# Patient Record
Sex: MALE | Race: BLACK OR AFRICAN AMERICAN | NOT HISPANIC OR LATINO | URBAN - METROPOLITAN AREA
[De-identification: names, ages, dates, MRNs, and addresses within clinical notes are randomized per-mention and may not be internally consistent; named-entity substitution may affect disease eponyms.]

---

## 2022-09-02 ENCOUNTER — INPATIENT (INPATIENT)
Facility: HOSPITAL | Age: 42
LOS: 5 days | Discharge: HOME | End: 2022-09-08
Attending: PSYCHIATRY & NEUROLOGY | Admitting: PSYCHIATRY & NEUROLOGY

## 2022-09-02 VITALS — HEIGHT: 68 IN

## 2022-09-02 DIAGNOSIS — F20.9 SCHIZOPHRENIA, UNSPECIFIED: ICD-10-CM

## 2022-09-02 DIAGNOSIS — F29 UNSPECIFIED PSYCHOSIS NOT DUE TO A SUBSTANCE OR KNOWN PHYSIOLOGICAL CONDITION: ICD-10-CM

## 2022-09-02 LAB
ALBUMIN SERPL ELPH-MCNC: 5 G/DL — SIGNIFICANT CHANGE UP (ref 3.3–5)
ALP SERPL-CCNC: 76 U/L — SIGNIFICANT CHANGE UP (ref 40–120)
ALT FLD-CCNC: 11 U/L — SIGNIFICANT CHANGE UP (ref 4–41)
ANION GAP SERPL CALC-SCNC: 12 MMOL/L — SIGNIFICANT CHANGE UP (ref 7–14)
APAP SERPL-MCNC: <10 UG/ML — LOW (ref 15–25)
AST SERPL-CCNC: 23 U/L — SIGNIFICANT CHANGE UP (ref 4–40)
B PERT DNA SPEC QL NAA+PROBE: SIGNIFICANT CHANGE UP
B PERT+PARAPERT DNA PNL SPEC NAA+PROBE: SIGNIFICANT CHANGE UP
BASOPHILS # BLD AUTO: 0.04 K/UL — SIGNIFICANT CHANGE UP (ref 0–0.2)
BASOPHILS NFR BLD AUTO: 0.3 % — SIGNIFICANT CHANGE UP (ref 0–2)
BILIRUB SERPL-MCNC: 0.7 MG/DL — SIGNIFICANT CHANGE UP (ref 0.2–1.2)
BORDETELLA PARAPERTUSSIS (RAPRVP): SIGNIFICANT CHANGE UP
BUN SERPL-MCNC: 7 MG/DL — SIGNIFICANT CHANGE UP (ref 7–23)
C PNEUM DNA SPEC QL NAA+PROBE: SIGNIFICANT CHANGE UP
CALCIUM SERPL-MCNC: 10.3 MG/DL — SIGNIFICANT CHANGE UP (ref 8.4–10.5)
CHLORIDE SERPL-SCNC: 101 MMOL/L — SIGNIFICANT CHANGE UP (ref 98–107)
CO2 SERPL-SCNC: 29 MMOL/L — SIGNIFICANT CHANGE UP (ref 22–31)
CREAT SERPL-MCNC: 0.87 MG/DL — SIGNIFICANT CHANGE UP (ref 0.5–1.3)
EGFR: 109 ML/MIN/1.73M2 — SIGNIFICANT CHANGE UP
EOSINOPHIL # BLD AUTO: 0.01 K/UL — SIGNIFICANT CHANGE UP (ref 0–0.5)
EOSINOPHIL NFR BLD AUTO: 0.1 % — SIGNIFICANT CHANGE UP (ref 0–6)
ETHANOL SERPL-MCNC: <10 MG/DL — SIGNIFICANT CHANGE UP
FLUAV SUBTYP SPEC NAA+PROBE: SIGNIFICANT CHANGE UP
FLUBV RNA SPEC QL NAA+PROBE: SIGNIFICANT CHANGE UP
GLUCOSE SERPL-MCNC: 113 MG/DL — HIGH (ref 70–99)
HADV DNA SPEC QL NAA+PROBE: SIGNIFICANT CHANGE UP
HCOV 229E RNA SPEC QL NAA+PROBE: SIGNIFICANT CHANGE UP
HCOV HKU1 RNA SPEC QL NAA+PROBE: SIGNIFICANT CHANGE UP
HCOV NL63 RNA SPEC QL NAA+PROBE: SIGNIFICANT CHANGE UP
HCOV OC43 RNA SPEC QL NAA+PROBE: SIGNIFICANT CHANGE UP
HCT VFR BLD CALC: 48.3 % — SIGNIFICANT CHANGE UP (ref 39–50)
HGB BLD-MCNC: 16.4 G/DL — SIGNIFICANT CHANGE UP (ref 13–17)
HMPV RNA SPEC QL NAA+PROBE: SIGNIFICANT CHANGE UP
HPIV1 RNA SPEC QL NAA+PROBE: SIGNIFICANT CHANGE UP
HPIV2 RNA SPEC QL NAA+PROBE: SIGNIFICANT CHANGE UP
HPIV3 RNA SPEC QL NAA+PROBE: SIGNIFICANT CHANGE UP
HPIV4 RNA SPEC QL NAA+PROBE: SIGNIFICANT CHANGE UP
IANC: 9.51 K/UL — HIGH (ref 1.8–7.4)
IMM GRANULOCYTES NFR BLD AUTO: 0.4 % — SIGNIFICANT CHANGE UP (ref 0–1.5)
LYMPHOCYTES # BLD AUTO: 17.5 % — SIGNIFICANT CHANGE UP (ref 13–44)
LYMPHOCYTES # BLD AUTO: 2.25 K/UL — SIGNIFICANT CHANGE UP (ref 1–3.3)
M PNEUMO DNA SPEC QL NAA+PROBE: SIGNIFICANT CHANGE UP
MCHC RBC-ENTMCNC: 30.8 PG — SIGNIFICANT CHANGE UP (ref 27–34)
MCHC RBC-ENTMCNC: 34 GM/DL — SIGNIFICANT CHANGE UP (ref 32–36)
MCV RBC AUTO: 90.8 FL — SIGNIFICANT CHANGE UP (ref 80–100)
MONOCYTES # BLD AUTO: 0.98 K/UL — HIGH (ref 0–0.9)
MONOCYTES NFR BLD AUTO: 7.6 % — SIGNIFICANT CHANGE UP (ref 2–14)
NEUTROPHILS # BLD AUTO: 9.51 K/UL — HIGH (ref 1.8–7.4)
NEUTROPHILS NFR BLD AUTO: 74.1 % — SIGNIFICANT CHANGE UP (ref 43–77)
NRBC # BLD: 0 /100 WBCS — SIGNIFICANT CHANGE UP (ref 0–0)
NRBC # FLD: 0 K/UL — SIGNIFICANT CHANGE UP (ref 0–0)
PLATELET # BLD AUTO: 242 K/UL — SIGNIFICANT CHANGE UP (ref 150–400)
POTASSIUM SERPL-MCNC: 3.5 MMOL/L — SIGNIFICANT CHANGE UP (ref 3.5–5.3)
POTASSIUM SERPL-SCNC: 3.5 MMOL/L — SIGNIFICANT CHANGE UP (ref 3.5–5.3)
PROT SERPL-MCNC: 6.9 G/DL — SIGNIFICANT CHANGE UP (ref 6–8.3)
RAPID RVP RESULT: SIGNIFICANT CHANGE UP
RBC # BLD: 5.32 M/UL — SIGNIFICANT CHANGE UP (ref 4.2–5.8)
RBC # FLD: 13.3 % — SIGNIFICANT CHANGE UP (ref 10.3–14.5)
RSV RNA SPEC QL NAA+PROBE: SIGNIFICANT CHANGE UP
RV+EV RNA SPEC QL NAA+PROBE: SIGNIFICANT CHANGE UP
SALICYLATES SERPL-MCNC: <0.3 MG/DL — LOW (ref 15–30)
SARS-COV-2 RNA SPEC QL NAA+PROBE: SIGNIFICANT CHANGE UP
SODIUM SERPL-SCNC: 142 MMOL/L — SIGNIFICANT CHANGE UP (ref 135–145)
TOXICOLOGY SCREEN, DRUGS OF ABUSE, SERUM RESULT: SIGNIFICANT CHANGE UP
TSH SERPL-MCNC: 1.51 UIU/ML — SIGNIFICANT CHANGE UP (ref 0.27–4.2)
WBC # BLD: 12.84 K/UL — HIGH (ref 3.8–10.5)
WBC # FLD AUTO: 12.84 K/UL — HIGH (ref 3.8–10.5)

## 2022-09-02 PROCEDURE — 99285 EMERGENCY DEPT VISIT HI MDM: CPT | Mod: 25

## 2022-09-02 PROCEDURE — 93010 ELECTROCARDIOGRAM REPORT: CPT

## 2022-09-02 PROCEDURE — 90792 PSYCH DIAG EVAL W/MED SRVCS: CPT

## 2022-09-02 RX ORDER — BACITRACIN ZINC 500 UNIT/G
1 OINTMENT IN PACKET (EA) TOPICAL ONCE
Refills: 0 | Status: COMPLETED | OUTPATIENT
Start: 2022-09-02 | End: 2022-09-02

## 2022-09-02 RX ORDER — HALOPERIDOL DECANOATE 100 MG/ML
5 INJECTION INTRAMUSCULAR ONCE
Refills: 0 | Status: COMPLETED | OUTPATIENT
Start: 2022-09-02 | End: 2022-09-02

## 2022-09-02 RX ORDER — TETANUS TOXOID, REDUCED DIPHTHERIA TOXOID AND ACELLULAR PERTUSSIS VACCINE, ADSORBED 5; 2.5; 8; 8; 2.5 [IU]/.5ML; [IU]/.5ML; UG/.5ML; UG/.5ML; UG/.5ML
0.5 SUSPENSION INTRAMUSCULAR ONCE
Refills: 0 | Status: COMPLETED | OUTPATIENT
Start: 2022-09-02 | End: 2022-09-02

## 2022-09-02 RX ORDER — DIPHENHYDRAMINE HCL 50 MG
50 CAPSULE ORAL ONCE
Refills: 0 | Status: COMPLETED | OUTPATIENT
Start: 2022-09-02 | End: 2022-09-02

## 2022-09-02 RX ADMIN — TETANUS TOXOID, REDUCED DIPHTHERIA TOXOID AND ACELLULAR PERTUSSIS VACCINE, ADSORBED 0.5 MILLILITER(S): 5; 2.5; 8; 8; 2.5 SUSPENSION INTRAMUSCULAR at 23:26

## 2022-09-02 RX ADMIN — Medication 2 MILLIGRAM(S): at 18:02

## 2022-09-02 RX ADMIN — Medication 1 APPLICATION(S): at 23:22

## 2022-09-02 RX ADMIN — Medication 50 MILLIGRAM(S): at 18:01

## 2022-09-02 RX ADMIN — HALOPERIDOL DECANOATE 5 MILLIGRAM(S): 100 INJECTION INTRAMUSCULAR at 18:01

## 2022-09-02 NOTE — ED ADULT TRIAGE NOTE - CHIEF COMPLAINT QUOTE
Pt arrives from home for erratic behavior. Pt non complaint with medications, admits to auditory hallucinations. Per EMS pt attempted to elope. Pt non complaint with vital signs or triage. PMHx Schizoaffective

## 2022-09-02 NOTE — ED BEHAVIORAL HEALTH ASSESSMENT NOTE - RISK ASSESSMENT
low risk- no known prior attempts, no reported suicidal ideation, supportive family,   risk- male gener, psychosis, cannabis abuse, Low Acute Suicide Risk

## 2022-09-02 NOTE — ED BEHAVIORAL HEALTH ASSESSMENT NOTE - NSBHATTESTCOMMENTATTENDFT_PSY_A_CORE
Patient is a year 42 old, male; domicile with mother; single; noncaregiver; unemployed on SSI ; PPH of schizoaffective disorder ; one previous hospitalizations; no known suicide attempts; no known history of violence or arrests; cannabis abuse; PMH unremarkable; BIB [employer] in handcuffs; activated by mother for agitation and worsening paranoia. Upon arrival to ED patient was agitated, paranoid and religiously preoccupied making statements that God was going to get him. Patient could not follow commands and did not respond to deescalation attempts. Patient received Ativan 2 mg Haldol 5 mg IM.   Patient presented to ED psychotic with paranoid delusions in the context of noncompliance with medications. He is anxious, scared, not sleeping; pacing at nights; gets agitated easily. Patient was agitated upon arrival and needed sedation. He is calm during the interview, but his presentation on arrival and collateral obtained from family supports patient is a danger to self and others 2/2 psychosis.  Patient requires inpatient hospitalization for safety and stabilization.

## 2022-09-02 NOTE — ED PROVIDER NOTE - CLINICAL SUMMARY MEDICAL DECISION MAKING FREE TEXT BOX
Labs, Urine Tox/UA, EKG.   Left great toe lac. Bacitracin to area. Tetanus updated.   Medical evaluation performed. There is no clinical evidence of intoxication or any acute medical problem requiring immediate intervention. Patient is awaiting psychiatric consultation. Final disposition will be determined by psychiatrist.

## 2022-09-02 NOTE — ED BEHAVIORAL HEALTH ASSESSMENT NOTE - NSBHATTESTAPPAMEND_PSY_A_CORE
I have personally seen and examined this patient. I fully participated in the care of this patient. I have made amendments to the documentation where appropriate and otherwise agree with the history, physical exam, and plan as documented by the FACUNDO

## 2022-09-02 NOTE — ED PROVIDER NOTE - OBJECTIVE STATEMENT
41 y/o  M hx  Schizophrenia BIBA secondary to  agitation and acting out behaviour at home. Appear paranoid and internally preoccupied .  Admits to medication compliance.  Denies SI/VH/AH/HI.  Denies pain, SOB, fever, chills, chest, abdominal discomfort.  Denies falling, punching or kicking any objects. Denies use of alcohol .  No evidence of physical, injuries, broken skin or deformities.

## 2022-09-02 NOTE — ED ADULT NURSE REASSESSMENT NOTE - NS ED NURSE REASSESS COMMENT FT1
Report received from day RN at 8:30pm shift change. Pt sleeping, respirations even and non labored. Pt awaiting psychiatric evaluation when awake and able to participate in interview.

## 2022-09-02 NOTE — ED BEHAVIORAL HEALTH NOTE - BEHAVIORAL HEALTH NOTE
COVID Exposure Screen- collateral (i.e. third-party, chart review, belongings, etc; include EMS and ED staff)  1.	*Has the patient had a COVID-19 test in the last 90 days?  (  ) Yes   ( x ) No   (  ) Unknown- Reason: _____  IF YES PROCEED TO QUESTION #2. IF NO OR UNKNOWN, PLEASE SKIP TO QUESTION #3.  2.	Date of test(s) and result(s): ________  3.	*Has the patient tested positive for COVID-19 antibodies? (  ) Yes   (  ) No   ( x ) Unknown- Reason: _____  IF YES PROCEED TO QUESTION #4. IF NO or UNKNOWN, PLEASE SKIP TO QUESTION #5.  4.	Date of positive antibody test: ________  5.	*Has the patient received 2 doses of the COVID-19 vaccine? ( x ) Yes   (  ) No   (  ) Unknown- Reason: _____  IF YES PROCEED TO QUESTION #6. IF NO or UNKNOWN, PLEASE SKIP TO QUESTION #7.  6.	 Date of second dose: ___unknown _____  7.	*In the past 10 days, has the patient been around anyone with a positive COVID-19 test?* (  ) Yes   (x  ) No   (  ) Unknown- Reason: __  IF YES PROCEED TO QUESTION #8. IF NO or UNKNOWN, PLEASE SKIP TO QUESTION #13.  8.	Was the patient within 6 feet of them for at least 15 minutes? (  ) Yes   (  ) No   (  ) Unknown- Reason: _____  9.	Did the patient provide care for them? (  ) Yes   (  ) No   (  ) Unknown- Reason: ______  10.	Did the patient have direct physical contact with them (touched, hugged, or kissed them)? (  ) Yes   (  ) No    (  ) Unknown- Reason: __  11.	Did the patient share eating or drinking utensils with them? (  ) Yes   (  ) No    (  ) Unknown- Reason: ____  12.	Did they sneeze, cough, or somehow get respiratory droplets on the patient? (  ) Yes   (  ) No    (  ) Unknown- Reason: ______  13.	*Has the patient been out of [place] within the past 10 days?* (  ) Yes   (x  ) No   (  ) Unknown- Reason: _____  IF YES PLEASE ANSWER THE FOLLOWING QUESTIONS:  14.	Which state/country did they go to? ______  15.	Were they there over 24 hours? (  ) Yes   (  ) No    (  ) Unknown- Reason: ______  16.	Date of return to [facility]: ______
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
## 2022-09-02 NOTE — ED BEHAVIORAL HEALTH ASSESSMENT NOTE - DESCRIPTION
single, lives with mother, unemployed on SSI, never , no children none known arrived agitated, screaming and religiously preoccupied.   ICU Vital Signs Last 24 Hrs  T(C): 36.8 (02 Sep 2022 19:30), Max: 36.8 (02 Sep 2022 19:30)  T(F): 98.2 (02 Sep 2022 19:30), Max: 98.2 (02 Sep 2022 19:30)  HR: 79 (02 Sep 2022 19:30) (79 - 79)  BP: 107/73 (02 Sep 2022 19:30) (107/73 - 107/73)  BP(mean): --  ABP: --  ABP(mean): --  RR: 18 (02 Sep 2022 19:30) (18 - 18)  SpO2: 98% (02 Sep 2022 19:30) (98% - 98%)    O2 Parameters below as of 02 Sep 2022 19:30  Patient On (Oxygen Delivery Method): room air

## 2022-09-02 NOTE — ED ADULT NURSE NOTE - CCCP TRG CHIEF CMPLNT
Consent: The patient's consent was obtained including but not limited to risks of crusting, scabbing, blistering, scarring, darker or lighter pigmentary change, recurrence, incomplete removal and infection. Render Post-Care Instructions In Note?: no psychiatric evaluation Number Of Freeze-Thaw Cycles: 1 freeze-thaw cycle Medical Necessity Information: It is in your best interest to select a reason for this procedure from the list below. All of these items fulfill various CMS LCD requirements except the new and changing color options. Detail Level: Detailed Post-Care Instructions: I reviewed with the patient in detail post-care instructions. Patient is to wear sunprotection, and avoid picking at any of the treated lesions. Pt may apply Vaseline to crusted or scabbing areas. Medical Necessity Clause: This procedure was medically necessary because the lesions that were treated were:
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
## 2022-09-02 NOTE — ED BEHAVIORAL HEALTH ASSESSMENT NOTE - OTHER PAST PSYCHIATRIC HISTORY (INCLUDE DETAILS REGARDING ONSET, COURSE OF ILLNESS, INPATIENT/OUTPATIENT TREATMENT)
[facility] 8/30/2017-4/20/2022 Schizophrenia, unspecified   [facility] 1/6/2019 - 1/7/2019 suicidal ideation

## 2022-09-02 NOTE — ED BEHAVIORAL HEALTH ASSESSMENT NOTE - SUMMARY
Patient is a year 42 old, male; domicile with mother; single; noncaregiver; unemployed on SSI ; PPH of schizoaffective disorder ; one previous hospitalizations; no known suicide attempts; no known history of violence or arrests; cannabis abuse; PMH unremarkable; biba/nypd in handcuffs; activated by mother for agitation and worsening paranoia. Upon arrival to ED patient was agitated, paranoid and religiously preoccupied making statements that God was going to get him. Patient could not follow commands and did not respond to deescalation attempts. Patient received Ativan 2 mg Haldol 5 mg IM.   Patient presented to ED psychotic with paranoid delusions in the context of noncompliance with medications. Although patient was sedated during interview, his presentation on arrival and collateral obtained from family supports patient is a danger to self and others 2/2 psychosis.  Patient requires inpatient hospitalization for safety and stabilization.   Patient will be admitted to [facility] on an involuntary status.   Recommendation  1 start Risperdal 1 mg bid  2 Ativan 2 mg/Haldol 5 mg po/im q6h prn agitation   3. Patient does not require constant observation on unit.

## 2022-09-02 NOTE — ED BEHAVIORAL HEALTH ASSESSMENT NOTE - NSBHATTESTTYPEVISIT_PSY_A_CORE
On-site Attending with Resident/Fellow/Student and FACUNDO (99XXX codes) Attending evaluating patient with FACUNDO (48830/25903 code)
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
## 2022-09-03 DIAGNOSIS — F12.10 CANNABIS ABUSE, UNCOMPLICATED: ICD-10-CM

## 2022-09-03 DIAGNOSIS — Z91.19 PATIENT'S NONCOMPLIANCE WITH OTHER MEDICAL TREATMENT AND REGIMEN: ICD-10-CM

## 2022-09-03 LAB
COVID-19 SPIKE DOMAIN AB INTERP: POSITIVE
COVID-19 SPIKE DOMAIN ANTIBODY RESULT: 5.71 U/ML — HIGH
SARS-COV-2 IGG+IGM SERPL QL IA: 5.71 U/ML — HIGH
SARS-COV-2 IGG+IGM SERPL QL IA: POSITIVE

## 2022-09-03 PROCEDURE — 99222 1ST HOSP IP/OBS MODERATE 55: CPT

## 2022-09-03 RX ORDER — HALOPERIDOL DECANOATE 100 MG/ML
5 INJECTION INTRAMUSCULAR ONCE
Refills: 0 | Status: DISCONTINUED | OUTPATIENT
Start: 2022-09-03 | End: 2022-09-08

## 2022-09-03 RX ORDER — CHLORHEXIDINE GLUCONATE 213 G/1000ML
1 SOLUTION TOPICAL DAILY
Refills: 0 | Status: DISCONTINUED | OUTPATIENT
Start: 2022-09-03 | End: 2022-09-08

## 2022-09-03 RX ORDER — HALOPERIDOL DECANOATE 100 MG/ML
5 INJECTION INTRAMUSCULAR EVERY 6 HOURS
Refills: 0 | Status: DISCONTINUED | OUTPATIENT
Start: 2022-09-03 | End: 2022-09-08

## 2022-09-03 RX ORDER — POVIDONE-IODINE 5 %
1 AEROSOL (ML) TOPICAL DAILY
Refills: 0 | Status: DISCONTINUED | OUTPATIENT
Start: 2022-09-03 | End: 2022-09-03

## 2022-09-03 RX ORDER — RISPERIDONE 4 MG/1
1 TABLET ORAL
Refills: 0 | Status: DISCONTINUED | OUTPATIENT
Start: 2022-09-03 | End: 2022-09-06

## 2022-09-03 RX ADMIN — RISPERIDONE 1 MILLIGRAM(S): 4 TABLET ORAL at 20:25

## 2022-09-03 RX ADMIN — RISPERIDONE 1 MILLIGRAM(S): 4 TABLET ORAL at 09:46

## 2022-09-03 NOTE — BH INPATIENT PSYCHIATRY ASSESSMENT NOTE - CURRENT MEDICATION
MEDICATIONS  (STANDING):  risperiDONE   Tablet 1 milliGRAM(s) Oral two times a day    MEDICATIONS  (PRN):  haloperidol     Tablet 5 milliGRAM(s) Oral every 6 hours PRN agitation  haloperidol    Injectable 5 milliGRAM(s) IntraMuscular once PRN agitation  LORazepam     Tablet 2 milliGRAM(s) Oral every 6 hours PRN agitaiton  LORazepam   Injectable 2 milliGRAM(s) IntraMuscular once PRN agitation   MEDICATIONS  (STANDING):  chlorhexidine 4% Liquid 1 Application(s) Topical daily  risperiDONE   Tablet 1 milliGRAM(s) Oral two times a day    MEDICATIONS  (PRN):  haloperidol     Tablet 5 milliGRAM(s) Oral every 6 hours PRN agitation  haloperidol    Injectable 5 milliGRAM(s) IntraMuscular once PRN agitation  LORazepam     Tablet 2 milliGRAM(s) Oral every 6 hours PRN agitaiton  LORazepam   Injectable 2 milliGRAM(s) IntraMuscular once PRN agitation

## 2022-09-03 NOTE — BH INPATIENT PSYCHIATRY ASSESSMENT NOTE - OTHER PAST PSYCHIATRIC HISTORY (INCLUDE DETAILS REGARDING ONSET, COURSE OF ILLNESS, INPATIENT/OUTPATIENT TREATMENT)
[facility] 8/30/2017-4/20/2022 Schizophrenia, unspecified   [facility] 1/6/2019 - 1/7/2019 suicidal ideation

## 2022-09-03 NOTE — BH INPATIENT PSYCHIATRY ASSESSMENT NOTE - RISK ASSESSMENT
low risk- no known prior attempts, no reported suicidal ideation, supportive family,   risk- male gener, psychosis, cannabis abuse,
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
## 2022-09-03 NOTE — ED BEHAVIORAL HEALTH NOTE - BEHAVIORAL HEALTH NOTE
Chart Reviewed.     Writer informed via percert log review of pending auth approval. Writer outreached  spoke w/ Ofelia NOVA. Writer provided clinicals over the phone. Writer informed by rep writer to receive call back, from [facility] within the hr for auth details. Writer provided best contact information for auth obtainment. Writer explored ref # for call with Ofelia PEERZ, however rep noted there was none at this time.     SW remains available.

## 2022-09-03 NOTE — BH INPATIENT PSYCHIATRY ASSESSMENT NOTE - NSBHASSESSSUMMFT_PSY_ALL_CORE
42 old, single male.  Patient has PPH of schizoaffective disorder, and cannabis use.  Patient has one previous hospitalization.  Patient was BIB  nypd in handcuffs; activated by mother for agitation and worsening paranoia and AH. admitted to [facility] on a 9.39 legal status.     Plan:  >Legal: 9.39  >Obs: Routine, no current SI. no need for CO, patient not expected to pose risk to self or others in controlled inpatient setting  >Psychiatric Meds: Restart outpatient medication regimen: Observe for tolerability and efficacy. Patient had been poorly adherent prior to admission.     PRN medications:  Ativan 2mg oral Q6HR PRN for agitation and anxiety.  Haldol 5mg oral Q6HR PRN for agitation.   Benadryl 50mg oral Q6HR PRN for agitation.   Vistaril 50mg oral Q6HR PRN for anxiety.  Desyrel 50mg oral QHS PRN for insomnia.   >Labs: Admission labs reviewed, no acute findings. Labs pending for tomorrow: WBC, A1c and Lipid panel. Hold antipsychotics if QTc >500  >Medical:   No acute concerns. No consultations needed at this time. No indication for CIWA. Patient with consistently stable VS, no visible physical symptoms of withdrawal.   During the course of treatment, will collaborate with medical team to manage medical issues.  >Diet: Regular  >Social: milieu/structured therapy  >Treatment Interventions: Groups and Individual Therapy/CBT, Motivational counseling for substance abuse related issues.   >Dispo: Collateral and dispo planning pending further symptom and medication optimization       42 old, single male.  Patient has PPH of schizoaffective disorder, and cannabis use.  Patient has one previous hospitalization.  Patient was BIB  nypd in handcuffs; activated by mother for agitation and worsening paranoia and AH. admitted to [facility] on a 9.39 legal status.     Plan:  >Legal: 9.39  >Obs: Routine, no current SI. no need for CO, patient not expected to pose risk to self or others in controlled inpatient setting  >Psychiatric Meds: Restart outpatient medication regimen: Risperdal 1mg BID. Observe for tolerability and efficacy. Patient had been poorly adherent prior to admission.     PRN medications:  Ativan 2mg oral Q6HR PRN for agitation and anxiety.  Haldol 5mg oral Q6HR PRN for agitation.   Benadryl 50mg oral Q6HR PRN for agitation.   Vistaril 50mg oral Q6HR PRN for anxiety.  Desyrel 50mg oral QHS PRN for insomnia.   >Labs: Admission labs reviewed, no acute findings. Labs pending for tomorrow: WBC, A1c and Lipid panel. Hold antipsychotics if QTc >500  >Medical:   No acute concerns. No consultations needed at this time. No indication for CIWA. Patient with consistently stable VS, no visible physical symptoms of withdrawal.   During the course of treatment, will collaborate with medical team to manage medical issues.  >Diet: Regular  >Social: milieu/structured therapy  >Treatment Interventions: Groups and Individual Therapy/CBT, Motivational counseling for substance abuse related issues.   >Dispo: Collateral and dispo planning pending further symptom and medication optimization

## 2022-09-03 NOTE — BH INPATIENT PSYCHIATRY ASSESSMENT NOTE - HOMICIDALITY / AGGRESSION (CURRENT/PAST)
None known in lifetime

## 2022-09-03 NOTE — BH INPATIENT PSYCHIATRY ASSESSMENT NOTE - ATTENDING COMMENTS
Patient seen and evaluated. I agree with Ms. Azevedo assessment and plan with no additional modifications.

## 2022-09-03 NOTE — BH INPATIENT PSYCHIATRY ASSESSMENT NOTE - NSBHCHARTREVIEWVS_PSY_A_CORE FT
Vital Signs Last 24 Hrs  T(C): 36.8 (09-03-22 @ 00:35), Max: 36.8 (09-02-22 @ 19:30)  T(F): 98.2 (09-03-22 @ 00:35), Max: 98.2 (09-02-22 @ 19:30)  HR: 81 (09-03-22 @ 00:35) (79 - 81)  BP: 120/82 (09-03-22 @ 00:35) (107/73 - 120/82)  BP(mean): --  RR: 16 (09-03-22 @ 00:35) (16 - 18)  SpO2: 97% (09-03-22 @ 00:35) (97% - 98%)

## 2022-09-03 NOTE — CONSULT NOTE ADULT - ASSESSMENT
42M living w/ mother, unemployed with history of schizophrenia off meds, HTN, HLD, gastritis admitted to [facility] for psychiatric stabilization medicine evaluation for L great toe skin abrasion/tear.    # L great toe skin tear; no s/s of infection, WBC elevation likely reactive  - iodine daily, dry dressing    # Fungal toe nail infection  - OP podiatry    # HTN: BP stable off meds  - monitor    # Schizophrenia  - on risperidone 1 mg BID  - plan per primary team  42M living w/ mother, unemployed with history of schizophrenia off meds, HTN, HLD, gastritis admitted to [facility] for psychiatric stabilization medicine evaluation for L great toe skin abrasion/tear.    # L great toe skin tear; no s/s of infection, WBC elevation likely reactive  - chlorhexidine daily (no povidone-iodine available dry dressing    # Fungal toe nail infection  - OP podiatry    # HTN: BP stable off meds  - monitor    # Schizophrenia  - on risperidone 1 mg BID  - plan per primary team  42M living w/ mother, unemployed with history of schizophrenia off meds, HTN, HLD, gastritis admitted to [facility] for psychiatric stabilization medicine evaluation for L great toe skin abrasion/tear.    # L great toe skin tear: Due to trauma/fall, no s/s of infection, WBC elevation likely reactive  -wash area w/ chlorhexidine daily (no povidone-iodine available per pharmacy), place dry sterile dressing    # Fungal toe nail infection  - OP podiatry    # HTN: BP stable off meds  - monitor    # Schizophrenia  - on risperidone 1 mg BID  - plan per primary team

## 2022-09-03 NOTE — PSYCHIATRIC REHAB INITIAL EVALUATION - NSBHPRRECOMMEND_PSY_ALL_CORE
Writer met with patient to orient him to psychiatric rehabilitation staff and services. Patient will be provided with a unit schedule to inform him of daily group times and unit functions. Patient was receptive to meeting with writer, but appeared paranoid throughout the interview as he looked over his shoulder numerous times.   Patient's reported reasoning for admission was congruent with a triggering event reported in ED note. Patient reported that the resident in the apartment above him has been stomping on the floors which triggers agitation and impairs his ability to focus and sleep. Patient would not elaborate on other events or symptoms leading up to admission. Per chart, patient's mother activated EMS and patient was accompanied by [employer] and in handcuffs. According to chart and collateral from patient's mother, patient has been hearing voices of "Allah", is religiously preoccupied, agitated, paranoid, pacing, and has not slept in the 6 days prior to admission. Patient was guarded throughout the interview and did not bring awareness or demonstrate insight to any other symptoms. Per chart, patient had 1 prior psychiatric hospitalization 5-6 years ago, and last received outpatient care in February of 2022. Patient denied SI/HI/AVH.  Patient was able to collaborate with writer to establish a treatment goal during inpatient stay. Patient reported that he already has some coping skills that distract him, but could use additional coping methods to help him regulate himself at home. Writer selected a goal for the patient to identify and practice 2 coping skills that will meet his needs. Psychiatric rehabilitation staff will continue to monitor progress towards this treatment goal, and encourage the patient to engage in both individual and group counseling sessions in efforts to facilitate therapeutic progress.

## 2022-09-03 NOTE — PSYCHIATRIC REHAB INITIAL EVALUATION - NSBHALCSUBTREAT_PSY_ALL_CORE
Per chart, patient last received outpatient care in 2/2022 at [facility]

## 2022-09-03 NOTE — BH INPATIENT PSYCHIATRY ASSESSMENT NOTE - HPI (INCLUDE ILLNESS QUALITY, SEVERITY, DURATION, TIMING, CONTEXT, MODIFYING FACTORS, ASSOCIATED SIGNS AND SYMPTOMS)
Patient was seen and evaluated, chart, medications and labs reviewed. Case discussed with nursing team.  On service for this 42 old, single male.  Patient has PPH of schizoaffective disorder, and cannabis use.  Patient has one previous hospitalization.  Patient was BIB  nypd in handcuffs; activated by mother for agitation and worsening paranoia and AH. admitted to [facility] on a 9.39 legal status. I have reviewed the initial psychiatric assessment in the electronic medical record, including the history of present illness, past psychiatric history, family/social history (no pertinent changes), and exam, and have confirmed the salient findings dated    9/2/22.    As per chart review, transferring records indicated the following:  Patient is a year 42 old, male; domicile with mother; single; noncaregiver; unemployed on SSI ; PPH of schizoaffective disorder ; one previous hospitalizations; no known suicide attempts; no known history of violence or arrests; cannabis abuse; PMH unremarkable; biba/nypd in handcuffs; activated by mother for agitation and worsening paranoia. Upon arrival to ED patient was agitated, paranoid and religiously preoccupied making statements that God was going to get him. Patient could not follow commands and did not respond to deescalation attempts. Patient received Ativan 2 mg Haldol 5 mg IM.  Patient is a poor historian and is not able to provide HPI. Patient was sedated and had difficulty participating in interview. He reports he was brought into the hospital because he was " hyped up" but was not able to elaborate.  Patient reports he has been experiencing A/H and although he is not able to make out what the voices are saying he reports they are frightening. Patient admits to smoking Cannabis daily but denies abusing ETOH or other illicite substances.   See below  collateral obtained from    Patient not sleeping, pacing, presenting scared and delusional. Sister reports since a week ago patient has been more paranoid, not sleeping and not caring for himself. Sister reports the trigger is a new resident who lives above their apartment who is a war  w/ ptsd. Sister states this new resident upstairs has been telling the patient his past war stories and /EMS have been to the home several times for the new resident. Since these events, patient will wake up crying, fearful and hasn’t slept in 6 days. Sister believes since the patient hasn’t slept he is becoming delusional. Patient’s appetite is poor and hygiene is okay. At baseline, patient is happy, coherent , can hold a conversation and talks about his future plans. Patient was at baseline last week. Patient has a h[facility] but discontinued treatment/medication in February 2022. Sister says patient had been doing well w/o treatment. Patient’s last psych hospitalization was 5-6 years ago but they could not recall the hospital. They report the patient is not suicidal/homicidal and is not violent or aggressive. Sister unsure of AV. NO medical problems reported and patient is covid vaccinated x2. Patient has no recent travel or exposure.  Sister reports she believes patient needs a therapist to talk to and a safe environment for the patient to get sleep. Writer agreed to keep the family updated.    On unit: information received from: Chart review and patient interview.     Patient was seen and evaluated, chart, medications and labs reviewed. Case discussed with nursing team.  On service for this 42 old, single male.  Patient has PPH of schizoaffective disorder, and cannabis use.  Patient has one previous hospitalization.  Patient was BIB  nypd in handcuffs; activated by mother for agitation and worsening paranoia and AH. admitted to [facility] on a 9.39 legal status. I have reviewed the initial psychiatric assessment in the electronic medical record, including the history of present illness, past psychiatric history, family/social history (no pertinent changes), and exam, and have confirmed the salient findings dated    9/2/22.    As per chart review, transferring records indicated the following:  Patient is a year 42 old, male; domicile with mother; single; noncaregiver; unemployed on SSI ; PPH of schizoaffective disorder ; one previous hospitalizations; no known suicide attempts; no known history of violence or arrests; cannabis abuse; PMH unremarkable; biba/nypd in handcuffs; activated by mother for agitation and worsening paranoia. Upon arrival to ED patient was agitated, paranoid and religiously preoccupied making statements that God was going to get him. Patient could not follow commands and did not respond to deescalation attempts. Patient received Ativan 2 mg Haldol 5 mg IM.  Patient is a poor historian and is not able to provide HPI. Patient was sedated and had difficulty participating in interview. He reports he was brought into the hospital because he was " hyped up" but was not able to elaborate.  Patient reports he has been experiencing A/H and although he is not able to make out what the voices are saying he reports they are frightening. Patient admits to smoking Cannabis daily but denies abusing ETOH or other illicite substances.   See below  collateral obtained from    Patient not sleeping, pacing, presenting scared and delusional. Sister reports since a week ago patient has been more paranoid, not sleeping and not caring for himself. Sister reports the trigger is a new resident who lives above their apartment who is a war  w/ ptsd. Sister states this new resident upstairs has been telling the patient his past war stories and /EMS have been to the home several times for the new resident. Since these events, patient will wake up crying, fearful and hasn’t slept in 6 days. Sister believes since the patient hasn’t slept he is becoming delusional. Patient’s appetite is poor and hygiene is okay. At baseline, patient is happy, coherent , can hold a conversation and talks about his future plans. Patient was at baseline last week. Patient has a hx of psych treatment at [facility] but discontinued treatment/medication in February 2022. Sister says patient had been doing well w/o treatment. Patient’s last psych hospitalization was 5-6 years ago but they could not recall the hospital. They report the patient is not suicidal/homicidal and is not violent or aggressive. Sister unsure of AVH. NO medical problems reported and patient is covid vaccinated x2. Patient has no recent travel or exposure.  Sister reports she believes patient needs a therapist to talk to and a safe environment for the patient to get sleep. Writer agreed to keep the family updated.    On unit: information received from: Chart review and patient interview.  Patient is followed up for psychotic decompensation in context of noncompliance with medications. Patient is discussed with nursing staff. No interval events. Patient slept throughout the night and is eating well. Has been in behavioral control, no po prn’s for aggression.   Patient is interviewed in interview room.  He is notably bizarre, appeared somewhat paranoid and suspicious.  When questioned about his mood pt reports he has been depressed.  Reports “I felt like I needed to come into hospital”  He denies SI/SIB/HI, reports no plan or intent, and engages in safety planning.    Patient was irritable, paranoid and religiously preoccupied making statements that God was going to get him. Patient is a poor historian and difficult to participate in meaningful interview. He reports he was brought into the hospital because he was " hyped up" but was not able to elaborate.  Patient reports he has been experiencing A/H and although he is not able to make out what the voices are saying he reports they are frightening. Patient reports he stopped his medications (was on Risperdal in March 2022), was going to [facility].   Patient denies VH. During interview patient appears disorganized with perceptual disturbances. Patient with poor reality testing, unable to engage in a meaningful interview.   Patient denies symptoms suggestive of anjelica: (denies grandiosity, reports racing thoughts and increased goal directed activities or pressured speech, and  elevated mood/ denied any increased in energy level causing sleep disruption). No signs of catatonia.  He denies history of violence, denies access to firearm. Denies legal issues. Denies past trauma. No PMH. L great toe skin tear; no s/s of infection, WBC elevation likely reactive, iodine daily, dry dressing

## 2022-09-04 LAB
A1C WITH ESTIMATED AVERAGE GLUCOSE RESULT: 5.4 % — SIGNIFICANT CHANGE UP (ref 4–5.6)
CHOLEST SERPL-MCNC: 151 MG/DL — SIGNIFICANT CHANGE UP
ESTIMATED AVERAGE GLUCOSE: 108 — SIGNIFICANT CHANGE UP
HDLC SERPL-MCNC: 77 MG/DL — SIGNIFICANT CHANGE UP
LIPID PNL WITH DIRECT LDL SERPL: 62 MG/DL — SIGNIFICANT CHANGE UP
NON HDL CHOLESTEROL: 74 MG/DL — SIGNIFICANT CHANGE UP
TRIGL SERPL-MCNC: 59 MG/DL — SIGNIFICANT CHANGE UP
WBC # BLD: 9.12 K/UL — SIGNIFICANT CHANGE UP (ref 3.8–10.5)
WBC # FLD AUTO: 9.12 K/UL — SIGNIFICANT CHANGE UP (ref 3.8–10.5)

## 2022-09-04 PROCEDURE — 99232 SBSQ HOSP IP/OBS MODERATE 35: CPT

## 2022-09-04 RX ORDER — HYDROXYZINE HCL 10 MG
25 TABLET ORAL ONCE
Refills: 0 | Status: DISCONTINUED | OUTPATIENT
Start: 2022-09-04 | End: 2022-09-04

## 2022-09-04 RX ADMIN — CHLORHEXIDINE GLUCONATE 1 APPLICATION(S): 213 SOLUTION TOPICAL at 09:25

## 2022-09-04 RX ADMIN — RISPERIDONE 1 MILLIGRAM(S): 4 TABLET ORAL at 09:25

## 2022-09-04 NOTE — BH INPATIENT PSYCHIATRY PROGRESS NOTE - NSBHCHARTREVIEWVS_PSY_A_CORE FT
Vital Signs Last 24 Hrs  T(C): 36.2 (09-04-22 @ 06:37), Max: 36.7 (09-03-22 @ 19:34)  T(F): 97.1 (09-04-22 @ 06:37), Max: 98 (09-03-22 @ 19:34)  HR: --  BP: --  BP(mean): --  RR: --  SpO2: --    Orthostatic VS  09-03-22 @ 20:41  Lying BP: --/-- HR: --  Sitting BP: 130/94 HR: 91  Standing BP: 150/93 HR: 105  Site: --  Mode: --   Vital Signs Last 24 Hrs  T(C): 36.2 (09-04-22 @ 06:37), Max: 36.7 (09-03-22 @ 19:34)  T(F): 97.1 (09-04-22 @ 06:37), Max: 98 (09-03-22 @ 19:34)  HR: 90 (09-04-22 @ 06:37) (90 - 90)  BP: 148/96 (09-04-22 @ 06:37) (148/96 - 148/96)  BP(mean): --  RR: --  SpO2: --    Orthostatic VS  09-03-22 @ 20:41  Lying BP: --/-- HR: --  Sitting BP: 130/94 HR: 91  Standing BP: 150/93 HR: 105  Site: --  Mode: --

## 2022-09-04 NOTE — BH INPATIENT PSYCHIATRY PROGRESS NOTE - CURRENT MEDICATION
MEDICATIONS  (STANDING):  chlorhexidine 4% Liquid 1 Application(s) Topical daily  risperiDONE   Tablet 1 milliGRAM(s) Oral two times a day    MEDICATIONS  (PRN):  haloperidol     Tablet 5 milliGRAM(s) Oral every 6 hours PRN agitation  haloperidol    Injectable 5 milliGRAM(s) IntraMuscular once PRN agitation  LORazepam     Tablet 2 milliGRAM(s) Oral every 6 hours PRN agitaiton  LORazepam   Injectable 2 milliGRAM(s) IntraMuscular once PRN agitation

## 2022-09-04 NOTE — ED BEHAVIORAL HEALTH NOTE - BEHAVIORAL HEALTH NOTE
SW attempted to contact [facility] tel# [phone] to obtain updated authorization information for this pt's inpt tx at [facility]. Per Alyce SALTER, UR must c/b during regular business hours because the clinical supervisor c/b was missed on 9/3. SW attempted to follow up on behalf of UR, noting that the ED SW was the after-hour contact for the pt arlyn Mead and supervisor reiterated guidance to c/b on Tuesday. SW spoke w/ Catracho PEREZ who works for after-hours line and stated that UR would need to contact member services as this pt was found in system, but they could not obtain any further information through their department either at this time

## 2022-09-04 NOTE — CHART NOTE - NSCHARTNOTEFT_GEN_A_CORE
Notified by RN that patient's manual BP is 150/100. BP rechecked manually an hour later and was 147/107. Patient states that he feels very anxious and stressed after discussing his personal life and hardships with staff. Patient is asymptomatic - denies fatigue, confusion, dizziness, diaphoresis, headache, blurry vision, SOB, CP, palpitations, pulsations, abdominal pain or swelling, edema. Physical exam unremarkable. Patient reports hx of HTN but states he hasn't needed to take any BP meds in a couple years. Unclear what BP meds he was taking. Patient states that he just wants to go to sleep now. Will continue to monitor and sign out to primary team to f/u tomorrow AM/hospitalist consult.

## 2022-09-04 NOTE — BH INPATIENT PSYCHIATRY PROGRESS NOTE - NSBHMETABOLIC_PSY_ALL_CORE_FT
BMI:   HbA1c:   Glucose:   BP: 120/82 (09-03-22 @ 00:35) (107/73 - 120/82)  Lipid Panel:  BMI:   HbA1c: A1C with Estimated Average Glucose Result: 5.4 % (09-04-22 @ 08:40)    Glucose:   BP: 148/96 (09-04-22 @ 06:37) (107/73 - 148/96)  Lipid Panel: Date/Time: 09-04-22 @ 08:40  Cholesterol, Serum: 151  Direct LDL: --  HDL Cholesterol, Serum: 77  Total Cholesterol/HDL Ration Measurement: --  Triglycerides, Serum: 59

## 2022-09-05 PROCEDURE — 99232 SBSQ HOSP IP/OBS MODERATE 35: CPT

## 2022-09-05 RX ADMIN — Medication 2 MILLIGRAM(S): at 21:19

## 2022-09-05 RX ADMIN — RISPERIDONE 1 MILLIGRAM(S): 4 TABLET ORAL at 21:19

## 2022-09-05 RX ADMIN — Medication 2 MILLIGRAM(S): at 00:23

## 2022-09-05 RX ADMIN — RISPERIDONE 1 MILLIGRAM(S): 4 TABLET ORAL at 08:48

## 2022-09-05 NOTE — BH INPATIENT PSYCHIATRY PROGRESS NOTE - NSBHCHARTREVIEWLAB_PSY_A_CORE FT
Admission labs reviewed, elevated WBC repeat labs in am
Admission labs reviewed, elevated WBC repeat labs in am

## 2022-09-05 NOTE — BH INPATIENT PSYCHIATRY PROGRESS NOTE - NSBHMETABOLIC_PSY_ALL_CORE_FT
BMI:   HbA1c: A1C with Estimated Average Glucose Result: 5.4 % (09-04-22 @ 08:40)    Glucose:   BP: 148/96 (09-04-22 @ 06:37) (107/73 - 148/96)  Lipid Panel: Date/Time: 09-04-22 @ 08:40  Cholesterol, Serum: 151  Direct LDL: --  HDL Cholesterol, Serum: 77  Total Cholesterol/HDL Ration Measurement: --  Triglycerides, Serum: 59   BMI:   HbA1c: A1C with Estimated Average Glucose Result: 5.4 % (09-04-22 @ 08:40)    Glucose:   BP: 149/103 (09-05-22 @ 07:50) (107/73 - 149/103)  Lipid Panel: Date/Time: 09-04-22 @ 08:40  Cholesterol, Serum: 151  Direct LDL: --  HDL Cholesterol, Serum: 77  Total Cholesterol/HDL Ration Measurement: --  Triglycerides, Serum: 59

## 2022-09-05 NOTE — BH INPATIENT PSYCHIATRY PROGRESS NOTE - NSBHCHARTREVIEWVS_PSY_A_CORE FT
Vital Signs Last 24 Hrs  T(C): --  T(F): --  HR: --  BP: --  BP(mean): --  RR: --  SpO2: --    Orthostatic VS  09-04-22 @ 19:57  Lying BP: --/-- HR: --  Sitting BP: 150/100 HR: --  Standing BP: --/-- HR: --  Site: --  Mode: --  Orthostatic VS  09-04-22 @ 19:51  Lying BP: --/-- HR: --  Sitting BP: 159/110 HR: 101  Standing BP: 159/109 HR: 103  Site: --  Mode: --  Orthostatic VS  09-03-22 @ 20:41  Lying BP: --/-- HR: --  Sitting BP: 130/94 HR: 91  Standing BP: 150/93 HR: 105  Site: --  Mode: --   Vital Signs Last 24 Hrs  T(C): 36.1 (09-05-22 @ 07:50), Max: 36.1 (09-05-22 @ 07:50)  T(F): 96.9 (09-05-22 @ 07:50), Max: 96.9 (09-05-22 @ 07:50)  HR: 90 (09-05-22 @ 07:50) (90 - 90)  BP: 149/103 (09-05-22 @ 07:50) (149/103 - 149/103)  BP(mean): --  RR: --  SpO2: --    Orthostatic VS  09-04-22 @ 19:57  Lying BP: --/-- HR: --  Sitting BP: 150/100 HR: --  Standing BP: --/-- HR: --  Site: --  Mode: --  Orthostatic VS  09-04-22 @ 19:51  Lying BP: --/-- HR: --  Sitting BP: 159/110 HR: 101  Standing BP: 159/109 HR: 103  Site: --  Mode: --  Orthostatic VS  09-03-22 @ 20:41  Lying BP: --/-- HR: --  Sitting BP: 130/94 HR: 91  Standing BP: 150/93 HR: 105  Site: --  Mode: --

## 2022-09-05 NOTE — BH INPATIENT PSYCHIATRY PROGRESS NOTE - NSBHASSESSSUMMFT_PSY_ALL_CORE
42 old, single male.  Patient has PPH of schizoaffective disorder, and cannabis use.  Patient has one previous hospitalization.  Patient was BIB  nypd in handcuffs; activated by mother for agitation and worsening paranoia and AH. admitted to Health system on a 9.39 legal status.     Plan:  >Legal: 9.39  >Obs: Routine,   >Psychiatric Meds: Restart outpatient medication regimen: Risperdal 1mg BID. Observe for tolerability and efficacy. Patient had been poorly adherent prior to admission.   PRN medications:  Ativan 2mg oral Q6HR PRN for agitation and anxiety.  Haldol 5mg oral Q6HR PRN for agitation.   Benadryl 50mg oral Q6HR PRN for agitation.   Vistaril 50mg oral Q6HR PRN for anxiety.  Desyrel 50mg oral QHS PRN for insomnia.   >Labs: Admission labs reviewed, no acute findings. Labs pending for tomorrow: WBC, A1c and Lipid panel. Hold antipsychotics if QTc >500  >Medical:   No acute concerns. No consultations needed at this time. No indication for CIWA. Patient with consistently stable VS, no visible physical symptoms of withdrawal.   During the course of treatment, will collaborate with medical team to manage medical issues.  >Diet: Regular  >Social: milieu/structured therapy  >Treatment Interventions: Groups and Individual Therapy/CBT, Motivational counseling for substance abuse related issues.   >Dispo: Collateral and dispo planning pending further symptom and medication optimization

## 2022-09-06 PROCEDURE — 99232 SBSQ HOSP IP/OBS MODERATE 35: CPT

## 2022-09-06 RX ORDER — RISPERIDONE 4 MG/1
2 TABLET ORAL AT BEDTIME
Refills: 0 | Status: DISCONTINUED | OUTPATIENT
Start: 2022-09-06 | End: 2022-09-08

## 2022-09-06 RX ORDER — AMLODIPINE BESYLATE 2.5 MG/1
5 TABLET ORAL DAILY
Refills: 0 | Status: DISCONTINUED | OUTPATIENT
Start: 2022-09-06 | End: 2022-09-08

## 2022-09-06 RX ORDER — RISPERIDONE 4 MG/1
1 TABLET ORAL DAILY
Refills: 0 | Status: DISCONTINUED | OUTPATIENT
Start: 2022-09-07 | End: 2022-09-08

## 2022-09-06 RX ADMIN — RISPERIDONE 1 MILLIGRAM(S): 4 TABLET ORAL at 08:26

## 2022-09-06 RX ADMIN — CHLORHEXIDINE GLUCONATE 1 APPLICATION(S): 213 SOLUTION TOPICAL at 08:26

## 2022-09-06 RX ADMIN — RISPERIDONE 2 MILLIGRAM(S): 4 TABLET ORAL at 21:32

## 2022-09-06 NOTE — BH INPATIENT PSYCHIATRY PROGRESS NOTE - CURRENT MEDICATION
MEDICATIONS  (STANDING):  amLODIPine   Tablet 5 milliGRAM(s) Oral daily  chlorhexidine 4% Liquid 1 Application(s) Topical daily  risperiDONE   Tablet 2 milliGRAM(s) Oral at bedtime    MEDICATIONS  (PRN):  haloperidol     Tablet 5 milliGRAM(s) Oral every 6 hours PRN agitation  haloperidol    Injectable 5 milliGRAM(s) IntraMuscular once PRN agitation  LORazepam     Tablet 2 milliGRAM(s) Oral every 6 hours PRN agitaiton  LORazepam   Injectable 2 milliGRAM(s) IntraMuscular once PRN agitation   MEDICATIONS  (STANDING):  amLODIPine   Tablet 5 milliGRAM(s) Oral daily  chlorhexidine 4% Liquid 1 Application(s) Topical daily  risperiDONE   Tablet 2 milliGRAM(s) Oral at bedtime  risperiDONE   Tablet 1 milliGRAM(s) Oral daily    MEDICATIONS  (PRN):  haloperidol     Tablet 5 milliGRAM(s) Oral every 6 hours PRN agitation  haloperidol    Injectable 5 milliGRAM(s) IntraMuscular once PRN agitation  LORazepam     Tablet 2 milliGRAM(s) Oral every 6 hours PRN agitaiton  LORazepam   Injectable 2 milliGRAM(s) IntraMuscular once PRN agitation

## 2022-09-06 NOTE — BH SOCIAL WORK INITIAL PSYCHOSOCIAL EVALUATION - OTHER PAST PSYCHIATRIC HISTORY (INCLUDE DETAILS REGARDING ONSET, COURSE OF ILLNESS, INPATIENT/OUTPATIENT TREATMENT)
42 old, single male.  Patient has PPH of schizoaffective disorder, and cannabis use.  Patient has one previous hospitalization.  Patient was BIB  nypd in handcuffs; activated by mother for agitation and worsening paranoia and AH. 42 old, single male.  Patient has PPH of schizoaffective disorder, and cannabis use.  Patient has one previous hospitalization.  Patient was BIB  [employer] in handcuffs; activated by mother for agitation and worsening paranoia and AH.

## 2022-09-06 NOTE — ADVANCED PRACTICE NURSE CONSULT - ASSESSMENT
Patient is a 42M living w/ mother, unemployed with history of schizophrenia off meds, HTN, HLD, gastritis admitted to [facility] for psychiatric stabilization medicine evaluation for L great toe skin abrasion/tear.    On assessment L great toe noted to have a small wound 0.5 x0.5 with scab due to trauma/fall, no s/s of infection noted.

## 2022-09-06 NOTE — ED BEHAVIORAL HEALTH NOTE - BEHAVIORAL HEALTH NOTE
PRECERTIFICATION:  For Harvey Moreno writer called [facility] and spoke to Arlin STERN who provided case # [id number]. writer faxed clinicals to [phone]. UR contact information for follow up.

## 2022-09-06 NOTE — BH INPATIENT PSYCHIATRY PROGRESS NOTE - NSBHCHARTREVIEWVS_PSY_A_CORE FT
Vital Signs Last 24 Hrs  T(C): 36.5 (09-06-22 @ 07:58), Max: 36.5 (09-06-22 @ 07:58)  T(F): 97.7 (09-06-22 @ 07:58), Max: 97.7 (09-06-22 @ 07:58)  HR: 84 (09-06-22 @ 07:58) (84 - 84)  BP: 141/91 (09-06-22 @ 07:58) (141/91 - 141/91)  BP(mean): --  RR: --  SpO2: --    Orthostatic VS  09-04-22 @ 19:57  Lying BP: --/-- HR: --  Sitting BP: 150/100 HR: --  Standing BP: --/-- HR: --  Site: --  Mode: --  Orthostatic VS  09-04-22 @ 19:51  Lying BP: --/-- HR: --  Sitting BP: 159/110 HR: 101  Standing BP: 159/109 HR: 103  Site: --  Mode: --   Vital Signs Last 24 Hrs  T(C): 36.2 (09-07-22 @ 08:27), Max: 36.2 (09-07-22 @ 08:27)  T(F): 97.2 (09-07-22 @ 08:27), Max: 97.2 (09-07-22 @ 08:27)  HR: 74 (09-07-22 @ 08:27) (74 - 74)  BP: 149/89 (09-07-22 @ 08:27) (149/89 - 149/89)  BP(mean): --  RR: --  SpO2: --

## 2022-09-06 NOTE — BH INPATIENT PSYCHIATRY PROGRESS NOTE - NSBHMETABOLIC_PSY_ALL_CORE_FT
BMI:   HbA1c: A1C with Estimated Average Glucose Result: 5.4 % (09-04-22 @ 08:40)    Glucose:   BP: 141/91 (09-06-22 @ 07:58) (141/91 - 149/103)  Lipid Panel: Date/Time: 09-04-22 @ 08:40  Cholesterol, Serum: 151  Direct LDL: --  HDL Cholesterol, Serum: 77  Total Cholesterol/HDL Ration Measurement: --  Triglycerides, Serum: 59   BMI:   HbA1c: A1C with Estimated Average Glucose Result: 5.4 % (09-04-22 @ 08:40)    Glucose:   BP: 149/89 (09-07-22 @ 08:27) (141/91 - 149/103)  Lipid Panel: Date/Time: 09-04-22 @ 08:40  Cholesterol, Serum: 151  Direct LDL: --  HDL Cholesterol, Serum: 77  Total Cholesterol/HDL Ration Measurement: --  Triglycerides, Serum: 59

## 2022-09-06 NOTE — BH TREATMENT PLAN - NSTXPSYCHOGOAL_PSY_ALL_CORE
Will engage in a 15 minute conversation with no irrational content
Patient/caregiver requests family/friend to interpret.

## 2022-09-06 NOTE — BH INPATIENT PSYCHIATRY PROGRESS NOTE - NSBHASSESSSUMMFT_PSY_ALL_CORE
42 old, single male.  Patient has PPH of schizoaffective disorder, and cannabis use.  Patient has one previous hospitalization.  Patient was BIB  nypd in handcuffs; activated by mother for agitation and worsening paranoia and AH. admitted to [facility] on a 9.39 legal status.     Plan:  >Legal: 9.39  >Obs: Routine,   >Psychiatric Meds: Restart outpatient medication regimen: Risperdal titrated to 1mg PO daily and 2mg PO at bedtime. Observe for tolerability and efficacy. Patient had been poorly adherent prior to admission.   PRN medications:  Ativan 2mg oral Q6HR PRN for agitation and anxiety.  Haldol 5mg oral Q6HR PRN for agitation.   Benadryl 50mg oral Q6HR PRN for agitation.   Vistaril 50mg oral Q6HR PRN for anxiety.  Desyrel 50mg oral QHS PRN for insomnia.   >Labs: Admission labs reviewed, no acute findings. Labs pending for tomorrow: WBC, A1c and Lipid panel. Hold antipsychotics if QTc >500  >Medical:   No acute concerns. No consultations needed at this time. No indication for CIWA. Patient with consistently stable VS, no visible physical symptoms of withdrawal.   During the course of treatment, will collaborate with medical team to manage medical issues.  >Diet: Regular  >Social: milieu/structured therapy  >Treatment Interventions: Groups and Individual Therapy/CBT, Motivational counseling for substance abuse related issues.   >Dispo: Collateral and dispo planning pending further symptom and medication optimization

## 2022-09-06 NOTE — BH TREATMENT PLAN - NSTXCOPEINTERPR_PSY_ALL_CORE
Over the next 7 days, psychiatric rehabilitation staff recommend that the patient engage in both individual and group counseling sessions to explore coping methods and facilitate progress towards her treatment goal.

## 2022-09-06 NOTE — ADVANCED PRACTICE NURSE CONSULT - RECOMMEDATIONS
To continue wash area w/ chlorhexidine daily and keep open to air.  Educated patient to keep feet clean, wear socks and to avoid trauma to the area  Endorsed to primary nurse

## 2022-09-06 NOTE — BH INPATIENT PSYCHIATRY PROGRESS NOTE - NSICDXBHSECONDARYDX_PSY_ALL_CORE
Cannabis abuse   F12.10  Noncompliance   Z91.19  

## 2022-09-06 NOTE — CHART NOTE - NSCHARTNOTEFT_GEN_A_CORE
Called about patient's elevated blood pressures.  He has been off meds for HTN, stating he checks BP at home and it is controlled.  Elevated while here, so would start amlodipine 5mg po daily.

## 2022-09-07 PROCEDURE — 99232 SBSQ HOSP IP/OBS MODERATE 35: CPT | Mod: 25

## 2022-09-07 PROCEDURE — 90853 GROUP PSYCHOTHERAPY: CPT

## 2022-09-07 RX ORDER — AMLODIPINE BESYLATE 2.5 MG/1
1 TABLET ORAL
Qty: 30 | Refills: 0
Start: 2022-09-07

## 2022-09-07 RX ORDER — CHLORHEXIDINE GLUCONATE 213 G/1000ML
1 SOLUTION TOPICAL
Qty: 1 | Refills: 0
Start: 2022-09-07 | End: 2022-09-07

## 2022-09-07 RX ORDER — RISPERIDONE 4 MG/1
1 TABLET ORAL
Qty: 30 | Refills: 0
Start: 2022-09-07

## 2022-09-07 RX ADMIN — CHLORHEXIDINE GLUCONATE 1 APPLICATION(S): 213 SOLUTION TOPICAL at 09:06

## 2022-09-07 RX ADMIN — RISPERIDONE 1 MILLIGRAM(S): 4 TABLET ORAL at 09:06

## 2022-09-07 RX ADMIN — RISPERIDONE 2 MILLIGRAM(S): 4 TABLET ORAL at 20:09

## 2022-09-07 RX ADMIN — AMLODIPINE BESYLATE 5 MILLIGRAM(S): 2.5 TABLET ORAL at 09:04

## 2022-09-07 NOTE — BH INPATIENT PSYCHIATRY DISCHARGE NOTE - OTHER PAST PSYCHIATRIC HISTORY (INCLUDE DETAILS REGARDING ONSET, COURSE OF ILLNESS, INPATIENT/OUTPATIENT TREATMENT)
42 old, single male.  Patient has PPH of schizoaffective disorder, and cannabis use.  Patient has one previous hospitalization.  Patient was BIB  [employer] in [place]; activated by mother for agitation and worsening paranoia and AH.

## 2022-09-07 NOTE — BH INPATIENT PSYCHIATRY PROGRESS NOTE - CURRENT MEDICATION
MEDICATIONS  (STANDING):  amLODIPine   Tablet 5 milliGRAM(s) Oral daily  chlorhexidine 4% Liquid 1 Application(s) Topical daily  risperiDONE   Tablet 2 milliGRAM(s) Oral at bedtime  risperiDONE   Tablet 1 milliGRAM(s) Oral daily    MEDICATIONS  (PRN):  haloperidol     Tablet 5 milliGRAM(s) Oral every 6 hours PRN agitation  haloperidol    Injectable 5 milliGRAM(s) IntraMuscular once PRN agitation  LORazepam     Tablet 2 milliGRAM(s) Oral every 6 hours PRN agitaiton  LORazepam   Injectable 2 milliGRAM(s) IntraMuscular once PRN agitation

## 2022-09-07 NOTE — BH INPATIENT PSYCHIATRY DISCHARGE NOTE - HOSPITAL COURSE
On the unit patient was paranoid and religiously preoccupied making statements that God was going to get him. Patient reported he has been experiencing AH and although he was not able to make out what the voices were saying he reported they are frightening.  Pt was started on Risperdal titrated to 1mg PO daily and 2mg PO at bedtime.  Pt also engaged in group therapy on the unit.  On the combination of medication with therapy, patient showed much improvement in psychotic sx.  Pt declined Invega Sustenna URIAS prior to discharge.  Pt reported him and his mother were going to stay with his sister after discharge, so he will not be around his triggering neighbor.  Pt's mother confirmed this and was in agreement with discharge plan.  She had no concerns about patient being compliant with PO medication.    On discharge, pt denied SI/HI/I/P or AH/VH or paranoia.  Pt eating and sleeping well.  Pt endorsed motivation to continue medication as prescribed and engage in outpatient treatment.  Safety planning done with patient and patient agreed to call 911 or go to the nearest ED if he finds himself a danger to himself or others.  Suicide hotline information give to patient with discharge documentation.     Although patient was admitted due to risk factors for violence/suicide including psychotic and mood sx, the patient’s risk for suicide/violence was mitigated during his hospitalization and his protective factors outweigh his risk factors at this time.  Pt is currently at low acute risk for suicide/violence.  Patient’s protective factors include:  no current SI/HI/I/P, willingness to engage in treatment, responsibility to family, family support, no hx of suicide attempts, no hx of aggression, no legal hx, and no current acute depressive, psychotic or manic sx.  Although the patient is at chronic risk for violence/suicide given his hx of psychiatric illness, hx of psychiatric hospitalizations and hx of substance use, this risk cannot be ameliorated by continued inpatient treatment.  The patient no longer met the criteria for psychiatric hospitalization at discharge.

## 2022-09-07 NOTE — BH INPATIENT PSYCHIATRY DISCHARGE NOTE - HPI (INCLUDE ILLNESS QUALITY, SEVERITY, DURATION, TIMING, CONTEXT, MODIFYING FACTORS, ASSOCIATED SIGNS AND SYMPTOMS)
Patient was seen and evaluated, chart, medications and labs reviewed. Case discussed with nursing team.  On service for this 42 old, single male.  Patient has PPH of schizoaffective disorder, and cannabis use.  Patient has one previous hospitalization.  Patient was BIB  nypd in handcuffs; activated by mother for agitation and worsening paranoia and AH. admitted to [facility] on a 9.39 legal status. I have reviewed the initial psychiatric assessment in the electronic medical record, including the history of present illness, past psychiatric history, family/social history (no pertinent changes), and exam, and have confirmed the salient findings dated    9/2/22.    As per chart review, transferring records indicated the following:  Patient is a year 42 old, male; domicile with mother; single; noncaregiver; unemployed on SSI ; PPH of schizoaffective disorder ; one previous hospitalizations; no known suicide attempts; no known history of violence or arrests; cannabis abuse; PMH unremarkable; biba/nypd in handcuffs; activated by mother for agitation and worsening paranoia. Upon arrival to ED patient was agitated, paranoid and religiously preoccupied making statements that God was going to get him. Patient could not follow commands and did not respond to deescalation attempts. Patient received Ativan 2 mg Haldol 5 mg IM.  Patient is a poor historian and is not able to provide HPI. Patient was sedated and had difficulty participating in interview. He reports he was brought into the hospital because he was " hyped up" but was not able to elaborate.  Patient reports he has been experiencing A/H and although he is not able to make out what the voices are saying he reports they are frightening. Patient admits to smoking Cannabis daily but denies abusing ETOH or other illicite substances.   See below  collateral obtained from    Patient not sleeping, pacing, presenting scared and delusional. Sister reports since a week ago patient has been more paranoid, not sleeping and not caring for himself. Sister reports the trigger is a new resident who lives above their apartment who is a war  w/ ptsd. Sister states this new resident upstairs has been telling the patient his past war stories and /EMS have been to the home several times for the new resident. Since these events, patient will wake up crying, fearful and hasn’t slept in 6 days. Sister believes since the patient hasn’t slept he is becoming delusional. Patient’s appetite is poor and hygiene is okay. At baseline, patient is happy, coherent , can hold a conversation and talks about his future plans. Patient was at baseline last week. Patient has a hx of psych treatment at "[facility] but discontinued treatment/medication in February 2022. Sister says patient had been doing well w/o treatment. Patient’s last psych hospitalization was 5-6 years ago but they could not recall the hospital. They report the patient is not suicidal/homicidal and is not violent or aggressive. Sister unsure of AVH. NO medical problems reported and patient is covid vaccinated x2. Patient has no recent travel or exposure.  Sister reports she believes patient needs a therapist to talk to and a safe environment for the patient to get sleep. Writer agreed to keep the family updated.    On unit: information received from: Chart review and patient interview.  Patient is followed up for psychotic decompensation in context of noncompliance with medications. Patient is discussed with nursing staff. No interval events. Patient slept throughout the night and is eating well. Has been in behavioral control, no po prn’s for aggression.   Patient is interviewed in interview room.  He is notably bizarre, appeared somewhat paranoid and suspicious.  When questioned about his mood pt reports he has been depressed.  Reports “I felt like I needed to come into hospital”  He denies SI/SIB/HI, reports no plan or intent, and engages in safety planning.    Patient was irritable, paranoid and religiously preoccupied making statements that God was going to get him. Patient is a poor historian and difficult to participate in meaningful interview. He reports he was brought into the hospital because he was " hyped up" but was not able to elaborate.  Patient reports he has been experiencing A/H and although he is not able to make out what the voices are saying he reports they are frightening. Patient reports he stopped his medications (was on Risperdal in March 2022), was going to [facility].   Patient denies VH. During interview patient appears disorganized with perceptual disturbances. Patient with poor reality testing, unable to engage in a meaningful interview.   Patient denies symptoms suggestive of anjelica: (denies grandiosity, reports racing thoughts and increased goal directed activities or pressured speech, and  elevated mood/ denied any increased in energy level causing sleep disruption). No signs of catatonia.  He denies history of violence, denies access to firearm. Denies legal issues. Denies past trauma. No PMH. L great toe skin tear; no s/s of infection, WBC elevation likely reactive, iodine daily, dry dressing

## 2022-09-07 NOTE — BH INPATIENT PSYCHIATRY PROGRESS NOTE - NSBHASSESSSUMMFT_PSY_ALL_CORE
42 old, single male.  Patient has PPH of schizoaffective disorder, and cannabis use.  Patient has one previous hospitalization.  Patient was BIB  nypd in handcuffs; activated by mother for agitation and worsening paranoia and AH. admitted to [facility] on a 9.39 legal status.     Plan:  >Legal: 9.39  >Obs: Routine,   >Psychiatric Meds: Restart outpatient medication regimen: Risperdal titrated to 1mg PO daily and 2mg PO at bedtime. Observe for tolerability and efficacy. Patient had been poorly adherent prior to admission.   PRN medications:  Ativan 2mg oral Q6HR PRN for agitation and anxiety.  Haldol 5mg oral Q6HR PRN for agitation.   Benadryl 50mg oral Q6HR PRN for agitation.   Vistaril 50mg oral Q6HR PRN for anxiety.  Desyrel 50mg oral QHS PRN for insomnia.   >Labs: Admission labs reviewed, no acute findings. Labs pending for tomorrow: WBC, A1c and Lipid panel. Hold antipsychotics if QTc >500  >Medical:   No acute concerns. No consultations needed at this time. No indication for CIWA. Patient with consistently stable VS, no visible physical symptoms of withdrawal.   During the course of treatment, will collaborate with medical team to manage medical issues.  >Diet: Regular  >Social: milieu/structured therapy  >Treatment Interventions: Groups and Individual Therapy/CBT, Motivational counseling for substance abuse related issues.   >Dispo: Collateral and dispo planning pending further symptom and medication optimization

## 2022-09-07 NOTE — BH PSYCHOLOGY - GROUP THERAPY NOTE - TOKEN PULL-DIAGNOSIS
Primary Diagnosis:  Schizoaffective disorder [F25.9]        Problem Dx:   Noncompliance [Z91.19]      Cannabis abuse [F12.10]      Schizophrenia, unspecified type [F20.9]

## 2022-09-07 NOTE — BH INPATIENT PSYCHIATRY PROGRESS NOTE - NSBHCHARTREVIEWVS_PSY_A_CORE FT
Vital Signs Last 24 Hrs  T(C): 36.2 (09-07-22 @ 08:27), Max: 36.2 (09-07-22 @ 08:27)  T(F): 97.2 (09-07-22 @ 08:27), Max: 97.2 (09-07-22 @ 08:27)  HR: 74 (09-07-22 @ 08:27) (74 - 74)  BP: 149/89 (09-07-22 @ 08:27) (149/89 - 149/89)  BP(mean): --  RR: --  SpO2: --

## 2022-09-07 NOTE — BH INPATIENT PSYCHIATRY DISCHARGE NOTE - NSBHDCMEDICALFT_PSY_A_CORE
Pt seen by hospitalist and wound care RN for left toe wound treatment Pt seen by hospitalist and wound care RN for left toe wound treatment.  Hospitalist also reviewed patient's BPs and recommended patient start amlodipine 5mg PO daily

## 2022-09-07 NOTE — BH INPATIENT PSYCHIATRY DISCHARGE NOTE - NSDCRECOMMENDMEDICALFT_PSY_ALL_CORE
Please follow-up with your primary care provider regarding your high blood pressure and left toe wound

## 2022-09-07 NOTE — BH PSYCHOLOGY - GROUP THERAPY NOTE - NSBHPSYCHOLPARTICIPCOMMENT_PSY_A_CORE FT
Pt engaged in group. He discussed feeling "good" on his decision to come to the hospital and receive care. Pt identified song to listen to for mindful listening activity and with support, was able to notice attributes of the song.

## 2022-09-07 NOTE — BH PSYCHOLOGY - GROUP THERAPY NOTE - NSPSYCHOLGRPCOGPT_PSY_A_CORE FT
Patient attended recovery oriented/acceptance & commitment therapy group. Group started with check in (current emotional state). Members then offered support to one another regarding current stressors. Group then focused in mindful listening activity. Members engaged in experiential exercise that encouraged attending to auditory stimuli (tempo, instruments) and recognizing when their minds pulled them away from the sounds. Members processed their personal experience in listening to each song with some members reporting difficulty staying focused on the music. Explored how focused attention can allow members to take effective action in coping with current problems.  facilitated discussion of concepts, encouraged active participation, and supported members providing feedback to peers.  The group concluded with a checkout.

## 2022-09-07 NOTE — BH INPATIENT PSYCHIATRY PROGRESS NOTE - NSBHMETABOLIC_PSY_ALL_CORE_FT
BMI:   HbA1c: A1C with Estimated Average Glucose Result: 5.4 % (09-04-22 @ 08:40)    Glucose:   BP: 149/89 (09-07-22 @ 08:27) (141/91 - 149/103)  Lipid Panel: Date/Time: 09-04-22 @ 08:40  Cholesterol, Serum: 151  Direct LDL: --  HDL Cholesterol, Serum: 77  Total Cholesterol/HDL Ration Measurement: --  Triglycerides, Serum: 59

## 2022-09-07 NOTE — BH INPATIENT PSYCHIATRY DISCHARGE NOTE - NSDCMRMEDTOKEN_GEN_ALL_CORE_FT
amLODIPine 5 mg oral tablet: 1 tab(s) orally once a day  Antiseptic Skin Cleanser 4% topical liquid: Apply topically to affected area once a day to affected area (left toe)  risperiDONE 1 mg oral tablet: 1 tab(s) orally once a day  risperiDONE 2 mg oral tablet: 1 tab(s) orally once a day (at bedtime)

## 2022-09-08 VITALS — DIASTOLIC BLOOD PRESSURE: 90 MMHG | SYSTOLIC BLOOD PRESSURE: 139 MMHG

## 2022-09-08 PROBLEM — F20.9 SCHIZOPHRENIA, UNSPECIFIED: Chronic | Status: ACTIVE | Noted: 2022-09-04

## 2022-09-08 PROCEDURE — 99238 HOSP IP/OBS DSCHRG MGMT 30/<: CPT

## 2022-09-08 RX ADMIN — AMLODIPINE BESYLATE 5 MILLIGRAM(S): 2.5 TABLET ORAL at 09:04

## 2022-09-08 RX ADMIN — RISPERIDONE 1 MILLIGRAM(S): 4 TABLET ORAL at 09:04

## 2022-09-08 NOTE — BH INPATIENT PSYCHIATRY PROGRESS NOTE - NSBHATTESTBILLINGAW_PSY_A_CORE
[id number]-Oghkftzxbe Inpatient care - moderate complexity - 25 minutes
[id number]-Kqzujupfnr Inpatient care - moderate complexity - 25 minutes
[id number]-Eobinzchzf Inpatient care - moderate complexity - 25 minutes
[id number]-Upwvcexten Inpatient care - moderate complexity - 25 minutes
Billing in another system

## 2022-09-08 NOTE — BH INPATIENT PSYCHIATRY PROGRESS NOTE - PRN MEDS
MEDICATIONS  (PRN):  haloperidol     Tablet 5 milliGRAM(s) Oral every 6 hours PRN agitation  haloperidol    Injectable 5 milliGRAM(s) IntraMuscular once PRN agitation  LORazepam     Tablet 2 milliGRAM(s) Oral every 6 hours PRN agitaiton  LORazepam   Injectable 2 milliGRAM(s) IntraMuscular once PRN agitation  

## 2022-09-08 NOTE — BH INPATIENT PSYCHIATRY PROGRESS NOTE - NSDCCRITERIA_PSY_ALL_CORE
Symptom stabilization  CGI<=3

## 2022-09-08 NOTE — BH SAFETY PLAN - ASK FOR HELP PHONE 3
ED Attending Note      Patient : Kailyn Carranza Age: 42 year old Sex: female   MRN: [id number] Encounter Date: 3/8/2022      History     Chief Complaint   Patient presents with   • Abdominal Pain         42-year-old female who states she does not have medical problems presents with abdominal pain and diarrhea that began yesterday morning.  The pain is located at the level of the umbilicus and comes and goes.  It was worse a while ago [place] why she decided to come in but now it is gone.  She does not have any nausea or vomiting.  She is having a couple episodes of loose diarrhea but it is not black.  No blood in stool.  Denies recent antibiotics but she has been to [place] recently.  No fevers.  No leg swelling or rash.  No chest pain or shortness of breath.  No respiratory symptoms.  No sick contacts.  Patient states she has not had intercourse in many years.        Please see additional notes as the daughter just brought in some information that the patient was at the cardiologist 4 days ago.  She was there for 6 months of shortness of breath.  They had did an EKG that showed a right bundle branch block.  She is scheduled to have a new stress test and an echo.          ALLERGIES:   Allergen Reactions   • Diazepam Nausea & Vomiting       There are no discharge medications for this patient.      There are no discharge medications for this patient.      Past History     Medical history denies  Surgical history: Denies  Family history: No inflammatory bowel disease.  Social history: Smokes an occasional cigarette.  No past medical history on file.    No past surgical history on file.    No family history on file.    Social History     Tobacco Use   • Smoking status: Former Smoker     Packs/day: 0.00   • Smokeless tobacco: Never Used   Substance Use Topics   • Alcohol use: Not on file   • Drug use: Not on file       Review of Systems   Constitutional: Negative for chills and fever.   HENT: Negative for congestion and sore  throat.    Eyes: Negative for visual disturbance.   Respiratory: Negative for cough and shortness of breath.    Cardiovascular: Negative for chest pain and leg swelling.   Gastrointestinal: Positive for abdominal pain and diarrhea. Negative for vomiting.   Endocrine: Negative for polyuria.   Genitourinary: Negative for dysuria.   Musculoskeletal: Negative for back pain and joint swelling.   Skin: Negative for rash.   Neurological: Negative for dizziness and headaches.   Hematological: Does not bruise/bleed easily.       Physical Exam     ED Triage Vitals [03/08/22 0729]   ED Triage Vitals Group      Temp 98.1 °F (36.7 °C)      Heart Rate 77      Resp 14      /70      SpO2 100 %      EtCO2 mmHg       Height 6' (1.829 m)      Weight 179 lb 3.7 oz (81.3 kg)      Weight Scale Used Scale in bed      BMI (Calculated) 24.31      IBW/kg (Calculated) 73.1       Physical Exam  Vitals and nursing note reviewed.   Constitutional:       Appearance: Normal appearance.   HENT:      Head: Atraumatic.      Right Ear: Tympanic membrane normal.      Left Ear: Tympanic membrane normal.      Nose: Nose normal.      Mouth/Throat:      Mouth: Mucous membranes are moist.      Pharynx: No oropharyngeal exudate or posterior oropharyngeal erythema.      Neck: Normal range of motion and neck supple.   Eyes:      Extraocular Movements: Extraocular movements intact.      Conjunctiva/sclera: Conjunctivae normal.      Pupils: Pupils are equal, round, and reactive to light.   Cardiovascular:      Rate and Rhythm: Normal rate and regular rhythm.      Pulses: Normal pulses.      Heart sounds: Normal heart sounds.   Pulmonary:      Effort: Pulmonary effort is normal.      Breath sounds: Normal breath sounds.   Abdominal:      General: Abdomen is flat. Bowel sounds are normal.      Palpations: Abdomen is soft.      Tenderness: There is no abdominal tenderness.   Musculoskeletal:         General: No swelling, tenderness, deformity or signs of  injury. Normal range of motion.      Right lower leg: No edema.      Left lower leg: No edema.   Skin:     General: Skin is warm and dry.      Findings: No rash.   Neurological:      General: No focal deficit present.      Mental Status: She is alert.      Sensory: No sensory deficit.      Motor: No weakness.   Psychiatric:         Mood and Affect: Mood normal.         Procedures    Lab Results     Results for orders placed or performed during the hospital encounter of 03/08/22   Urinalysis & Reflex Microscopy With Culture If Indicated   Result Value Ref Range    COLOR, URINALYSIS Straw     APPEARANCE, URINALYSIS Clear     GLUCOSE, URINALYSIS Negative Negative mg/dL    BILIRUBIN, URINALYSIS Negative Negative    KETONES, URINALYSIS Negative Negative mg/dL    SPECIFIC GRAVITY, URINALYSIS 1.005 1.005 - 1.030    OCCULT BLOOD, URINALYSIS Negative Negative    PH, URINALYSIS 6.0 5.0 - 7.0    PROTEIN, URINALYSIS Negative Negative mg/dL    UROBILINOGEN, URINALYSIS 0.2 0.2, 1.0 mg/dL    NITRITE, URINALYSIS Negative Negative    LEUKOCYTE ESTERASE, URINALYSIS Negative Negative   Beta HCG Quantitative Pregnancy   Result Value Ref Range    HCG, Quantitative <2 <=7 mUnits/mL   Comprehensive Metabolic Panel   Result Value Ref Range    Fasting Status      Sodium 140 135 - 145 mmol/L    Potassium 3.7 3.4 - 5.1 mmol/L    Chloride 110 (H) 98 - 107 mmol/L    Carbon Dioxide 28 21 - 32 mmol/L    Anion Gap 6 (L) 10 - 20 mmol/L    Glucose 100 (H) 70 - 99 mg/dL    BUN 8 6 - 20 mg/dL    Creatinine 0.76 0.51 - 0.95 mg/dL    Glomerular Filtration Rate >90 >=60    BUN/ Creatinine Ratio 11 7 - 25    Calcium 8.8 8.4 - 10.2 mg/dL    Bilirubin, Total 0.2 0.2 - 1.0 mg/dL    GOT/AST 11 <=37 Units/L    GPT/ALT 15 <64 Units/L    Alkaline Phosphatase 76 45 - 117 Units/L    Albumin 3.5 (L) 3.6 - 5.1 g/dL    Protein, Total 7.3 6.4 - 8.2 g/dL    Globulin 3.8 2.0 - 4.0 g/dL    A/G Ratio 0.9 (L) 1.0 - 2.4   Lipase   Result Value Ref Range    Lipase 123 73 -  393 Units/L   CBC with Automated Differential (performable only)   Result Value Ref Range    WBC 6.2 4.2 - 11.0 K/mcL    RBC 4.28 4.00 - 5.20 mil/mcL    HGB 12.3 12.0 - 15.5 g/dL    HCT 35.0 (L) 36.0 - 46.5 %    MCV 81.8 78.0 - 100.0 fl    MCH 28.7 26.0 - 34.0 pg    MCHC 35.1 32.0 - 36.5 g/dL    RDW-CV 14.2 11.0 - 15.0 %    RDW-SD 41.5 39.0 - 50.0 fL     140 - 450 K/mcL    NRBC 0 <=0 /100 WBC    Neutrophil, Percent 61 %    Lymphocytes, Percent 27 %    Mono, Percent 7 %    Eosinophils, Percent 4 %    Basophils, Percent 1 %    Immature Granulocytes 0 %    Absolute Neutrophils 3.8 1.8 - 7.7 K/mcL    Absolute Lymphocytes 1.7 1.0 - 4.8 K/mcL    Absolute Monocytes 0.4 0.3 - 0.9 K/mcL    Absolute Eosinophils  0.3 0.0 - 0.5 K/mcL    Absolute Basophils 0.0 0.0 - 0.3 K/mcL    Absolute Immmature Granulocytes 0.0 0.0 - 0.2 K/mcL     Medications   sodium chloride (NORMAL SALINE) 0.9 % bolus 1,000 mL (0 mLs Intravenous Completed 3/8/22 0832)   iohexol (OMNIPAQUE 350 INJECT) contrast solution 100 mL (100 mLs Intravenous Given 3/8/22 0926)         Radiology Results     Imaging Results          CT ABDOMEN PELVIS W CONTRAST - IV contrast only (Final result)  Result time 03/08/22 09:41:46    Final result                 Impression:      1. Nonspecific thickening involving the large bowel which may be  technical/related to underdistention with infectious/inflammatory  etiologies not excluded in the appropriate clinical setting.  2. Possible cystitis versus urinary bladder underdistention.  3. Fibroid uterus.  4. Additional findings as described above.    Electronically Signed by: AMELIA PENALOZA M.D.   Signed on: 3/8/2022 9:41 AM                Narrative:    EXAM:  CT ABDOMEN PELVIS W CONTRAST    CLINICAL INDICATION: Abdominal pain.     COMPARISON:  No comparison was made.    TECHNIQUE: CT abdomen and pelvis was performed with contrast. Iterative  reconstruction was used as a radiation dose reduction technique.    CONTRAST: 100 mL  of Omnipaque 350 was administered intravenously.    FINDINGS:    Minimal atelectasis in the included lungs.    Contracted gallbladder without radiodense gallstones. Probable focal fatty  infiltration adjacent to the falciform ligament within the liver. Small  vague area of hypoattenuation in the right hepatic lobe which is  incompletely characterized (for example series 3 image 13). Sequela of  prior granulomatous disease within the spleen. No significant  peripancreatic edema or well loculated peripancreatic fluid collection. No  focal adrenal lesion. No obstructive uropathy.    Nonspecific thickening of the stomach which may be technical or related to  gastritis. No bowel obstruction. Fat-containing umbilical hernia.  Nondilated appendix. Areas of thickening involving portions of large bowel  which demonstrates areas of incomplete distention.    The abdominal aorta is normal in caliber.    Nonspecific wall thickening of the incompletely distended urinary bladder.  Bulky heterogeneous appearance of the uterus with a dominant lesions within  the uterine myometrium measuring approximately 5.2 cm, likely related to a  fibroid. There is a foreign body within the vagina. A 1.7 cm hypodensity in  the right adnexal region, likely an ovarian cyst.    No significant free fluid or drainable fluid collection. No free air.  Scattered prominent lymph nodes which are nonspecific.    Intact bony structures. Degenerative changes of the lower lumbar spine.  Minimal left convex curvature of the lumbar spine.                                MDM   MDM  Number of Diagnoses or Management Options  Diagnosis management comments: Patient looks well at this time and has normal abdominal examination.  Could be diarrheal illness.  Will give fluids and perform work-up with labs and CT.  Does not seem to be gynecologic.  No risk of STD or pregnancy.      ED Course     ED Course as of 03/08/22 1422   Tue Mar 08, 2022   0730 Daughter dropped off a  referral note from a cardiologist named Dr. Verdugo.  Patient has been complaining shortness of breath for 6 months.  She has been noncompliant with her medications including cholesterol medicine and blood pressure medicine.  She is also not taking her sertraline.  The EKG did show a right bundle branch block 4 days ago.  Patient scheduled to have the stress test as well as an echo. [MB]   4836 I went to check on patient and she was resting comfortably. [MB]   6495 CT reviewed.  Minimal colonic thickening may or may not be related to the diarrheal illness.  Labs reviewed and unremarkable.  Urine within normal limits.  Patient okay for discharge and follow-up with GI [MB]      ED Course User Index  [MB] Anup Razo MD       Clinical Impression     ED Diagnoses        Final diagnoses    Abdominal pain, unspecified abdominal location          Diarrhea, unspecified type                 Disposition        Discharge 3/8/2022 10:54 AM  Kailyn Carranza discharge to home/self care.          Anup Razo MD   3/8/2022 7:26 AM                      Anup Razo MD  03/08/22 7648     *In my cell*

## 2022-09-08 NOTE — BH DISCHARGE NOTE NURSING/SOCIAL WORK/PSYCH REHAB - NSCDUDCCRISIS_PSY_A_CORE
[facility] Well  1 [phone]) [facility]-WELL ([phone])  Text "WELL" to [phone]  Website: www.[employer]/.[employer] 1 [phone]) 071-FSCI (6990) Website: www.safehorizon.org/.National Suicide Prevention Lifeline 1 (268) 682-0584/.  [employer]  1 (418) LIFENET (721-2503)/.  [facility]  [address] [place] [address] [phone]   Hours:  Monday through Friday from 9 AM to 3 PM/.  [employer] - [facility] Crisis Line  1 [phone], Option 1

## 2022-09-08 NOTE — BH DISCHARGE NOTE NURSING/SOCIAL WORK/PSYCH REHAB - DISCHARGE INSTRUCTIONS AFTERCARE APPOINTMENTS
In order to check the location, date, or time of your aftercare appointment, please refer to your Discharge Instructions Document given to you upon leaving the hospital.  If you have lost the instructions please call [phone]

## 2022-09-08 NOTE — BH INPATIENT PSYCHIATRY PROGRESS NOTE - NSBHMETABOLIC_PSY_ALL_CORE_FT
BMI:   HbA1c: A1C with Estimated Average Glucose Result: 5.4 % (09-04-22 @ 08:40)    Glucose:   BP: 139/90 (09-08-22 @ 10:29) (139/90 - 149/89)  Lipid Panel: Date/Time: 09-04-22 @ 08:40  Cholesterol, Serum: 151  Direct LDL: --  HDL Cholesterol, Serum: 77  Total Cholesterol/HDL Ration Measurement: --  Triglycerides, Serum: 59

## 2022-09-08 NOTE — BH INPATIENT PSYCHIATRY PROGRESS NOTE - NSBHFUPINTERVALHXFT_PSY_A_CORE
Pt compliant with medication and tolerating it well.  Chart reviewed and case discussed with treatment team.  No events reported overnight.  Pt denies SI/HI/I/P or AH/VH or paranoia.  Pt reports eating and sleeping well.  Pt reports the plan continues to be to stay with his sister after discharge and he is in agreement with this plan, thinks it will be better for him.  Pt declining Invega URIAS, reports he will remember to take PO medication and his mother will remind him.  He feels ready for discharge soon.    Writer and SW spoke to patient's mother, Kate ([phone]), who reported she has been visiting patient and speaking to him on the phone and he is doing better and his thoughts are clear.  She feels patient is ready for discharge soon.  She confirms that she and patient are going to stay with his sister and not going back to their residence where the triggering neighbor resides upstairs.  She feels patient does not need URIAS and will be compliant with PO medication.  She reports patient stopped medication in March and was doing well until the neighbor triggered his sx.
Patient is followed up for psychotic decompensation in context of noncompliance with medications. Patient is discussed with nursing staff. No interval events. Patient slept throughout the night and is eating well. Has been in behavioral control, no po prn’s for aggression.   Patient is interviewed in dayroom.  He reports feeling  “ok””  He denies SI/SIB/HI, reports no plan or intent, and engages in safety planning.  Reports he had a visit with mother and it went well.   Patient still paranoid but less religiously preoccupied. He denies [facility] states “my mind is clearer”  Continue to provide therapeutic support. No acute medical concerns: L great toe skin tear; no s/s of infection, iodine daily, dry dressing  
Patient was seen and evaluated, chart, medications and labs reviewed. Case discussed with nursing team.  On service for this 42 old, single male.  Patient has PPH of schizoaffective disorder, and cannabis use.  Patient has one previous hospitalization.  Patient was BIB  nypd in handcuffs; activated by mother for agitation and worsening paranoia and AH. admitted to [facility] on a 9.39 legal status. I have reviewed the initial psychiatric assessment in the electronic medical record, including the history of present illness, past psychiatric history, family/social history (no pertinent changes), and exam, and have confirmed the salient findings dated    9/2/22.    Patient is followed up for psychotic decompensation in context of noncompliance with medications. Patient is discussed with nursing staff. No interval events. Patient slept throughout the night and is eating well. Has been in behavioral control, no po prn’s for aggression.   Patient is interviewed in dayroom.  He reports feeling  “ok””  He denies SI/SIB/HI, reports no plan or intent, and engages in safety planning.    Patient was calmer today, still paranoid and religiously preoccupied making statements that God was going to get him. Patient reports he has been experiencing A/H and although he is not able to make out what the voices are saying he reports they are frightening. Patient reports he stopped his medications (was on Risperdal in March 2022).  He denies AVH today, states “my mind is better”  Patient reports looking forward to visit with mother later today  Continue to provide therapeutic support. No acute medical concerns: L great toe skin tear; no s/s of infection, iodine daily, dry dressing  
Pt compliant with medication and tolerating it well.  Chart reviewed and case discussed with treatment team.  No events reported overnight.  Pt reports prior to admission a new neighbor moved in on the second floor of his residence and this neighbor has been banging on the floor, has been mad and talking to patient about stressful things.  Pt reports he was on psychotropic medication for 13 years and stopped it in March because he was doing well.  Pt reports he was not able to sleep well at home, but has been sleeping on the unit.  Pt reports eating well.  Pt denies SI/HI/I/P or AH/VH.  Pt reports he can stay with his sister after discharge, so he does not have to deal with the new neighbor.
Pt compliant with medication and tolerating it well.  Chart reviewed and case discussed with treatment team.  No events reported overnight.  Pt denies SI/HI/I/P or AH/VH or paranoia.  Pt reports eating and sleeping well.  Pt feels good about staying with his sister after discharge where he gets along with the neighbors.  Pt endorses motivation to continue medication as prescribed and engage in outpatient treatment.  Pt agrees to follow-up with his PCP regarding his high blood pressure and toe wound.

## 2022-09-08 NOTE — BH INPATIENT PSYCHIATRY PROGRESS NOTE - NSBHCHARTREVIEWVS_PSY_A_CORE FT
Vital Signs Last 24 Hrs  T(C): 37 (09-08-22 @ 08:27), Max: 37 (09-08-22 @ 08:27)  T(F): 98.6 (09-08-22 @ 08:27), Max: 98.6 (09-08-22 @ 08:27)  HR: 79 (09-08-22 @ 08:27) (79 - 79)  BP: 139/90 (09-08-22 @ 10:29) (139/90 - 139/90)  BP(mean): --  RR: --  SpO2: --

## 2022-09-08 NOTE — BH INPATIENT PSYCHIATRY PROGRESS NOTE - NSBHMSESPEECH_PSY_A_CORE
Normal volume, rate, productivity, spontaneity and articulation
Abnormal as indicated, otherwise normal...
Abnormal as indicated, otherwise normal...
Normal volume, rate, productivity, spontaneity and articulation
Normal volume, rate, productivity, spontaneity and articulation

## 2022-09-08 NOTE — BH DISCHARGE NOTE NURSING/SOCIAL WORK/PSYCH REHAB - PATIENT PORTAL LINK FT
You can access the FollowMyHealth Patient Portal offered by [facility] by registering at the following website: [email or web address]. By joining [facility]’s FollowMyHealth portal, you will also be able to view your health information using other applications (apps) compatible with our system.

## 2022-09-08 NOTE — BH DISCHARGE NOTE NURSING/SOCIAL WORK/PSYCH REHAB - NSDCVIVACCINE_GEN_ALL_CORE_FT
Tdap; 02-Sep-2022 23:26; Matt Castañeda (DEONDRE); [employer]; [id number] (Exp. Date: 14-Oct-2024); IntraMuscular; Deltoid Right.; 0.5 milliLiter(s); VIS (VIS Published: 09-May-2013, VIS Presented: 02-Sep-2022);

## 2022-09-08 NOTE — BH INPATIENT PSYCHIATRY PROGRESS NOTE - NSBHATTESTTYPEVISIT_PSY_A_CORE
On-site Attending supervising FACUNDO (99XXX codes)

## 2022-09-08 NOTE — BH INPATIENT PSYCHIATRY PROGRESS NOTE - NSBHATTESTAPPBILLTIME_PSY_A_CORE
I attest my time as FACUNDO is greater than 50% of the total combined time spent on qualifying patient care activities. I have reviewed and verified the documentation.

## 2022-09-08 NOTE — BH DISCHARGE NOTE NURSING/SOCIAL WORK/PSYCH REHAB - NSDCPRGOAL_PSY_ALL_CORE
Writer met with patient to safety plan for discharge and discuss the patient’s progress throughout the inpatient stay. Patient was receptive to safety planning and readily identified coping skills, triggers, social support, and reasons for living.  Upon admission, patient reportedly presented to the ED with a PPH of schizoaffective disorder and one past hospitalization. Patient was reportedly agitated, paranoid, religiously preoccupied and making statements that God was going to get him. During inpatient stay, patient was visible on the unit and pleasant with both staff and peers. Patient was guarded around psychotic symptoms that were reported in the ED. Patient participated in 40% of daily psychiatric rehabilitation groups and met his treatment goal of identifying and utilizing 2 coping skills that meet his needs as evidenced by his ability to name at least 3 during safety planning.   At discharge, patient is not exhibiting paranoia or Buddhist preoccupation, and reported that he feels hopeful regarding his new living situation. Patient exhibits medication compliance, good behavior control, and good ADLs. Patient denied distressing thoughts/SI/HI/AVH. Writer met with patient to safety plan for discharge and discuss the patient’s progress throughout the inpatient stay. Patient was receptive to safety planning and readily identified coping skills, triggers, social support, and reasons for living.  Upon admission, patient reportedly presented to the ED with a PPH of schizoaffective disorder and one past hospitalization. Patient was reportedly agitated, paranoid, religiously preoccupied and making statements that God was going to get him. During inpatient stay, patient was visible on the unit and pleasant with both staff and peers. Patient was guarded around psychotic symptoms that were reported in the ED. Patient participated in 40% of daily psychiatric rehabilitation groups and met his treatment goal of identifying and utilizing 2 coping skills that meet his needs as evidenced by his ability to name at least 3 during safety planning.   At discharge, patient does not appear to be exhibiting paranoia or Mormonism preoccupation, and reported that he feels hopeful regarding his new living situation. Patient exhibits medication compliance, good behavior control, and good ADLs. Patient denied distressing thoughts/SI/HI/AVH.

## 2022-09-08 NOTE — BH INPATIENT PSYCHIATRY PROGRESS NOTE - NSTXDISORGGOAL_PSY_ALL_CORE
Will demonstrate purposeful and predictable thoughts/behaviors by making a request

## 2022-09-08 NOTE — BH INPATIENT PSYCHIATRY PROGRESS NOTE - NSBHFUPINTERVALCCFT_PSY_A_CORE
Pt seen f/u for agitation and psychosis
psychosis
psychosis

## 2022-09-08 NOTE — BH INPATIENT PSYCHIATRY PROGRESS NOTE - NSBHASSESSSUMMFT_PSY_ALL_CORE
42 old, single male.  Patient has PPH of schizoaffective disorder, and cannabis use.  Patient has one previous hospitalization.  Patient was BIB  nypd in handcuffs; activated by mother for agitation and worsening paranoia and AH. admitted to [facility] on a 9.39 legal status.     Plan:  >Legal: 9.39  >Obs: Routine,   >Psychiatric Meds: Restart outpatient medication regimen: Risperdal titrated to 1mg PO daily and 2mg PO at bedtime. Observe for tolerability and efficacy. Patient had been poorly adherent prior to admission.   PRN medications:  Ativan 2mg oral Q6HR PRN for agitation and anxiety.  Haldol 5mg oral Q6HR PRN for agitation.   Benadryl 50mg oral Q6HR PRN for agitation.   Vistaril 50mg oral Q6HR PRN for anxiety.  Desyrel 50mg oral QHS PRN for insomnia.   >Labs: Admission labs reviewed, no acute findings. Labs pending for tomorrow: WBC, A1c and Lipid panel. Hold antipsychotics if QTc >500  >Medical:   No acute concerns. No consultations needed at this time. No indication for CIWA. Patient with consistently stable VS, no visible physical symptoms of withdrawal.   During the course of treatment, will collaborate with medical team to manage medical issues.  >Diet: Regular  >Social: milieu/structured therapy  >Treatment Interventions: Groups and Individual Therapy/CBT, Motivational counseling for substance abuse related issues.   >Dispo: Pt safe to discharge home with outpatient care

## 2022-09-08 NOTE — BH INPATIENT PSYCHIATRY PROGRESS NOTE - NSBHPSYCHOLCOGORIENT_PSY_A_CORE
Oriented to time, place, person, situation
Not fully oriented...
Not fully oriented...
Oriented to time, place, person, situation
Oriented to time, place, person, situation

## 2022-09-26 ENCOUNTER — OUTPATIENT (OUTPATIENT)
Dept: OUTPATIENT SERVICES | Facility: HOSPITAL | Age: 42
LOS: 1 days | Discharge: HOME | End: 2022-09-26

## 2022-09-29 PROCEDURE — 90791 PSYCH DIAGNOSTIC EVALUATION: CPT

## 2022-10-04 NOTE — SOCIAL WORK POST DISCHARGE FOLLOW UP NOTE - NSBHSWFOLLOWUP_PSY_ALL_CORE_FT
Patients mother called  to report that PT has a appointment scheduled for intake on October 25. This case is now closed

## 2022-11-08 DIAGNOSIS — F25.9 SCHIZOAFFECTIVE DISORDER, UNSPECIFIED: ICD-10-CM

## 2025-01-07 NOTE — BH INPATIENT PSYCHIATRY ASSESSMENT NOTE - NSBHATTESTAPPBILLTIME_PSY_A_CORE
Take antibiotic as prescribed starting tomorrow.  Take ibuprofen 600 mg 3 times a day and add Tylenol 650 mg as needed for persistent pain or fever.  See a doctor if not improving in 3 days.   I attest my time as FACUNDO is greater than 50% of the total combined time spent on qualifying patient care activities. I have reviewed and verified the documentation.